# Patient Record
Sex: FEMALE | Race: WHITE | NOT HISPANIC OR LATINO | Employment: FULL TIME | URBAN - METROPOLITAN AREA
[De-identification: names, ages, dates, MRNs, and addresses within clinical notes are randomized per-mention and may not be internally consistent; named-entity substitution may affect disease eponyms.]

---

## 2023-12-18 ENCOUNTER — HOSPITAL ENCOUNTER (EMERGENCY)
Facility: HOSPITAL | Age: 33
Discharge: HOME | End: 2023-12-18
Attending: EMERGENCY MEDICINE | Admitting: EMERGENCY MEDICINE
Payer: COMMERCIAL

## 2023-12-18 ENCOUNTER — APPOINTMENT (EMERGENCY)
Dept: RADIOLOGY | Facility: HOSPITAL | Age: 33
End: 2023-12-18
Payer: COMMERCIAL

## 2023-12-18 ENCOUNTER — TELEPHONE (OUTPATIENT)
Dept: OBGYN CLINIC | Facility: CLINIC | Age: 33
End: 2023-12-18

## 2023-12-18 VITALS
TEMPERATURE: 99 F | BODY MASS INDEX: 24.6 KG/M2 | SYSTOLIC BLOOD PRESSURE: 107 MMHG | OXYGEN SATURATION: 97 % | HEART RATE: 102 BPM | WEIGHT: 143.3 LBS | RESPIRATION RATE: 18 BRPM | DIASTOLIC BLOOD PRESSURE: 53 MMHG

## 2023-12-18 DIAGNOSIS — J10.1 INFLUENZA A: Primary | ICD-10-CM

## 2023-12-18 DIAGNOSIS — R07.89 CHEST TIGHTNESS: ICD-10-CM

## 2023-12-18 LAB
ALBUMIN SERPL BCP-MCNC: 3.6 G/DL (ref 3.5–5)
ALP SERPL-CCNC: 80 U/L (ref 34–104)
ALT SERPL W P-5'-P-CCNC: 8 U/L (ref 7–52)
ANION GAP SERPL CALCULATED.3IONS-SCNC: 6 MMOL/L
AST SERPL W P-5'-P-CCNC: 12 U/L (ref 13–39)
BASOPHILS # BLD AUTO: 0.03 THOUSANDS/ÂΜL (ref 0–0.1)
BASOPHILS NFR BLD AUTO: 0 % (ref 0–1)
BILIRUB SERPL-MCNC: 0.34 MG/DL (ref 0.2–1)
BUN SERPL-MCNC: 7 MG/DL (ref 5–25)
CALCIUM SERPL-MCNC: 8.4 MG/DL (ref 8.4–10.2)
CARDIAC TROPONIN I PNL SERPL HS: 5 NG/L
CHLORIDE SERPL-SCNC: 105 MMOL/L (ref 96–108)
CO2 SERPL-SCNC: 24 MMOL/L (ref 21–32)
CREAT SERPL-MCNC: 0.61 MG/DL (ref 0.6–1.3)
EOSINOPHIL # BLD AUTO: 0.11 THOUSAND/ÂΜL (ref 0–0.61)
EOSINOPHIL NFR BLD AUTO: 1 % (ref 0–6)
ERYTHROCYTE [DISTWIDTH] IN BLOOD BY AUTOMATED COUNT: 11.9 % (ref 11.6–15.1)
FLUAV RNA RESP QL NAA+PROBE: POSITIVE
FLUBV RNA RESP QL NAA+PROBE: NEGATIVE
GFR SERPL CREATININE-BSD FRML MDRD: 120 ML/MIN/1.73SQ M
GLUCOSE SERPL-MCNC: 77 MG/DL (ref 65–140)
HCT VFR BLD AUTO: 35.5 % (ref 34.8–46.1)
HGB BLD-MCNC: 11.9 G/DL (ref 11.5–15.4)
IMM GRANULOCYTES # BLD AUTO: 0.1 THOUSAND/UL (ref 0–0.2)
IMM GRANULOCYTES NFR BLD AUTO: 1 % (ref 0–2)
LYMPHOCYTES # BLD AUTO: 1.1 THOUSANDS/ÂΜL (ref 0.6–4.47)
LYMPHOCYTES NFR BLD AUTO: 13 % (ref 14–44)
MCH RBC QN AUTO: 31.7 PG (ref 26.8–34.3)
MCHC RBC AUTO-ENTMCNC: 33.5 G/DL (ref 31.4–37.4)
MCV RBC AUTO: 95 FL (ref 82–98)
MONOCYTES # BLD AUTO: 0.52 THOUSAND/ÂΜL (ref 0.17–1.22)
MONOCYTES NFR BLD AUTO: 6 % (ref 4–12)
NEUTROPHILS # BLD AUTO: 6.47 THOUSANDS/ÂΜL (ref 1.85–7.62)
NEUTS SEG NFR BLD AUTO: 79 % (ref 43–75)
NRBC BLD AUTO-RTO: 0 /100 WBCS
PLATELET # BLD AUTO: 188 THOUSANDS/UL (ref 149–390)
PMV BLD AUTO: 10.1 FL (ref 8.9–12.7)
POTASSIUM SERPL-SCNC: 3.6 MMOL/L (ref 3.5–5.3)
PROT SERPL-MCNC: 6.4 G/DL (ref 6.4–8.4)
RBC # BLD AUTO: 3.75 MILLION/UL (ref 3.81–5.12)
RSV RNA RESP QL NAA+PROBE: NEGATIVE
SARS-COV-2 RNA RESP QL NAA+PROBE: NEGATIVE
SODIUM SERPL-SCNC: 135 MMOL/L (ref 135–147)
WBC # BLD AUTO: 8.33 THOUSAND/UL (ref 4.31–10.16)

## 2023-12-18 PROCEDURE — 99285 EMERGENCY DEPT VISIT HI MDM: CPT | Performed by: EMERGENCY MEDICINE

## 2023-12-18 PROCEDURE — 71046 X-RAY EXAM CHEST 2 VIEWS: CPT

## 2023-12-18 PROCEDURE — 93005 ELECTROCARDIOGRAM TRACING: CPT

## 2023-12-18 PROCEDURE — 99284 EMERGENCY DEPT VISIT MOD MDM: CPT

## 2023-12-18 PROCEDURE — 84484 ASSAY OF TROPONIN QUANT: CPT | Performed by: EMERGENCY MEDICINE

## 2023-12-18 PROCEDURE — 85025 COMPLETE CBC W/AUTO DIFF WBC: CPT | Performed by: EMERGENCY MEDICINE

## 2023-12-18 PROCEDURE — 96360 HYDRATION IV INFUSION INIT: CPT

## 2023-12-18 PROCEDURE — 0241U HB NFCT DS VIR RESP RNA 4 TRGT: CPT | Performed by: EMERGENCY MEDICINE

## 2023-12-18 PROCEDURE — 36415 COLL VENOUS BLD VENIPUNCTURE: CPT | Performed by: EMERGENCY MEDICINE

## 2023-12-18 PROCEDURE — 80053 COMPREHEN METABOLIC PANEL: CPT | Performed by: EMERGENCY MEDICINE

## 2023-12-18 RX ORDER — OSELTAMIVIR PHOSPHATE 75 MG/1
75 CAPSULE ORAL EVERY 12 HOURS
Qty: 10 CAPSULE | Refills: 0 | Status: SHIPPED | OUTPATIENT
Start: 2023-12-18 | End: 2023-12-23

## 2023-12-18 RX ORDER — OSELTAMIVIR PHOSPHATE 75 MG/1
75 CAPSULE ORAL ONCE
Status: COMPLETED | OUTPATIENT
Start: 2023-12-18 | End: 2023-12-18

## 2023-12-18 RX ORDER — OSELTAMIVIR PHOSPHATE 75 MG/1
75 CAPSULE ORAL EVERY 12 HOURS
Qty: 10 CAPSULE | Refills: 0 | Status: SHIPPED | OUTPATIENT
Start: 2023-12-18 | End: 2023-12-18

## 2023-12-18 RX ADMIN — OSELTAMIVIR PHOSPHATE 75 MG: 75 CAPSULE ORAL at 16:03

## 2023-12-18 RX ADMIN — SODIUM CHLORIDE 1000 ML: 0.9 INJECTION, SOLUTION INTRAVENOUS at 16:08

## 2023-12-18 NOTE — QUICK NOTE
Fetal data:  Nonstress test: date 12/18/23 Time: 1806 - 1826  Baseline:  130bpm  Variability: moderate  Accelerations: present, 10x10  Decelerations: absent  Contractions: absent  Assessment: reactive  Plan: Stable for discharge. Planned outpatient follow-up to establish care with ALESSANDRA on 1/3/24.    D/w Dr. Vivian Currie MD  OB/GYN PGY-3

## 2023-12-18 NOTE — ED PROVIDER NOTES
History  Chief Complaint   Patient presents with    Cough     Patient here for eval of productive cough, cold like sx x2 weeks. Chest pressure, upper back pain and SOB x3 days that's worsening. 27 weeks PREGNANT. Denies any bleeding or change in fetal movements. Taking robitussin OTC mild relief.      33-year-old female, 28 weeks pregnant, G2, P1, coming into the ED for evaluation of cough, fatigue, hoarse voice for the past 3 days starting Friday.  She states she has essentially been sick for the past 3 weeks but things changed on Friday.  She states she has had discomfort in her chest, not just with coughing but at rest.  She denies a productive cough.  Denies a fever.  She went to OB triage and they sent her down to the ER for evaluation.  She denies any leakage of vaginal fluid, no vaginal bleeding, normal fetal movement.      History provided by:  Patient   used: No    Cough  Associated symptoms: shortness of breath        Prior to Admission Medications   Prescriptions Last Dose Informant Patient Reported? Taking?   Lactobacillus (PROBIOTIC ACIDOPHILUS PO)   Yes No   Sig: Take by mouth   ciprofloxacin (CILOXAN) 0.3 % ophthalmic solution   No No   Sig: Apply 1 drop to eye 4 (four) times a day   hyoscyamine (ANASPAZ,LEVSIN) 0.125 MG tablet  Self Yes No   Sig: Take 0.125 mg by mouth   magnesium 30 MG tablet  Self Yes No   Sig: Take 30 mg by mouth 2 (two) times a day   multivitamin (THERAGRAN) TABS  Self Yes No   Sig: Take 1 tablet by mouth daily   norethindrone-ethinyl estradiol (JUNEL FE 1/20) 1-20 MG-MCG per tablet  Self Yes No   Sig: Take 1 tablet by mouth daily   omeprazole (PriLOSEC) 20 mg delayed release capsule  Self Yes No   Sig: Take 20 mg by mouth daily   pyridoxine (VITAMIN B6) 100 mg tablet  Self Yes No   Sig: Take 100 mg by mouth daily      Facility-Administered Medications: None       Past Medical History:   Diagnosis Date    Abnormal Pap smear of cervix 01/2014    Herpes      Perineal pain in female 06/2018       Past Surgical History:   Procedure Laterality Date    TOOTH EXTRACTION         Family History   Problem Relation Age of Onset    Other Family         Cardiac Disorder    Stroke Family     Hypertension Family     Hypertension Father     Hypertension Maternal Grandmother      I have reviewed and agree with the history as documented.    E-Cigarette/Vaping     E-Cigarette/Vaping Substances     Social History     Tobacco Use    Smoking status: Never    Smokeless tobacco: Never   Substance Use Topics    Alcohol use: Yes    Drug use: Never       Review of Systems   Constitutional:  Positive for fatigue.   Respiratory:  Positive for cough and shortness of breath.    All other systems reviewed and are negative.      Physical Exam  Physical Exam  Vitals and nursing note reviewed.   Constitutional:       General: She is not in acute distress.     Appearance: Normal appearance. She is well-developed and normal weight. She is not ill-appearing, toxic-appearing or diaphoretic.      Comments: Voice mildly hoarse   HENT:      Head: Normocephalic and atraumatic.      Right Ear: External ear normal.      Left Ear: External ear normal.      Nose: Nose normal.      Mouth/Throat:      Mouth: Mucous membranes are moist.      Pharynx: Oropharynx is clear.   Eyes:      Extraocular Movements: Extraocular movements intact.      Conjunctiva/sclera: Conjunctivae normal.      Pupils: Pupils are equal, round, and reactive to light.   Cardiovascular:      Rate and Rhythm: Normal rate and regular rhythm.      Pulses: Normal pulses.      Heart sounds: Normal heart sounds.   Pulmonary:      Effort: Pulmonary effort is normal. No respiratory distress.      Breath sounds: Normal breath sounds. No wheezing, rhonchi or rales.   Abdominal:      General: Abdomen is flat. Bowel sounds are normal. There is no distension or abdominal bruit. There are no signs of injury.      Palpations: Abdomen is soft. There is no  shifting dullness.      Tenderness: There is no abdominal tenderness.      Comments: Gravid abdomen, above umbilicus   Genitourinary:     Adnexa: Right adnexa normal and left adnexa normal.   Musculoskeletal:         General: No swelling or tenderness. Normal range of motion.      Cervical back: Normal range of motion and neck supple.      Right lower leg: No edema.      Left lower leg: No edema.   Skin:     General: Skin is warm and dry.      Capillary Refill: Capillary refill takes less than 2 seconds.   Neurological:      General: No focal deficit present.      Mental Status: She is alert and oriented to person, place, and time. Mental status is at baseline.   Psychiatric:         Mood and Affect: Mood normal.         Behavior: Behavior normal.         Vital Signs  ED Triage Vitals   Temperature Pulse Respirations Blood Pressure SpO2   12/18/23 1316 12/18/23 1316 12/18/23 1316 12/18/23 1316 12/18/23 1316   99 °F (37.2 °C) 102 18 107/53 97 %      Temp Source Heart Rate Source Patient Position - Orthostatic VS BP Location FiO2 (%)   12/18/23 1316 12/18/23 1316 -- 12/18/23 1316 --   Oral Monitor  Right arm       Pain Score       12/18/23 1321       7           Vitals:    12/18/23 1316   BP: 107/53   Pulse: 102         Visual Acuity      ED Medications  Medications   oseltamivir (TAMIFLU) capsule 75 mg (75 mg Oral Given 12/18/23 1603)   sodium chloride 0.9 % bolus 1,000 mL (0 mL Intravenous Stopped 12/18/23 1708)       Diagnostic Studies  Results Reviewed       Procedure Component Value Units Date/Time    HS Troponin 0hr (reflex protocol) [[id number]]  (Normal) Collected: 12/18/23 1608    Lab Status: Final result Specimen: Blood from Arm, Right Updated: 12/18/23 1655     hs TnI 0hr 5 ng/L     Comprehensive metabolic panel [[id number]]  (Abnormal) Collected: 12/18/23 1608    Lab Status: Final result Specimen: Blood from Arm, Right Updated: 12/18/23 1652     Sodium 135 mmol/L      Potassium 3.6 mmol/L      Chloride 105  mmol/L      CO2 24 mmol/L      ANION GAP 6 mmol/L      BUN 7 mg/dL      Creatinine 0.61 mg/dL      Glucose 77 mg/dL      Calcium 8.4 mg/dL      AST 12 U/L      ALT 8 U/L      Alkaline Phosphatase 80 U/L      Total Protein 6.4 g/dL      Albumin 3.6 g/dL      Total Bilirubin 0.34 mg/dL      eGFR 120 ml/min/1.73sq m     Narrative:      National Kidney Disease Foundation guidelines for Chronic Kidney Disease (CKD):     Stage 1 with normal or high GFR (GFR > 90 mL/min/1.73 square meters)    Stage 2 Mild CKD (GFR = 60-89 mL/min/1.73 square meters)    Stage 3A Moderate CKD (GFR = 45-59 mL/min/1.73 square meters)    Stage 3B Moderate CKD (GFR = 30-44 mL/min/1.73 square meters)    Stage 4 Severe CKD (GFR = 15-29 mL/min/1.73 square meters)    Stage 5 End Stage CKD (GFR <15 mL/min/1.73 square meters)  Note: GFR calculation is accurate only with a steady state creatinine    CBC and differential [[id number]]  (Abnormal) Collected: 12/18/23 1608    Lab Status: Final result Specimen: Blood from Arm, Right Updated: 12/18/23 1633     WBC 8.33 Thousand/uL      RBC 3.75 Million/uL      Hemoglobin 11.9 g/dL      Hematocrit 35.5 %      MCV 95 fL      MCH 31.7 pg      MCHC 33.5 g/dL      RDW 11.9 %      MPV 10.1 fL      Platelets 188 Thousands/uL      nRBC 0 /100 WBCs      Neutrophils Relative 79 %      Immat GRANS % 1 %      Lymphocytes Relative 13 %      Monocytes Relative 6 %      Eosinophils Relative 1 %      Basophils Relative 0 %      Neutrophils Absolute 6.47 Thousands/µL      Immature Grans Absolute 0.10 Thousand/uL      Lymphocytes Absolute 1.10 Thousands/µL      Monocytes Absolute 0.52 Thousand/µL      Eosinophils Absolute 0.11 Thousand/µL      Basophils Absolute 0.03 Thousands/µL     FLU/RSV/COVID - if FLU/RSV clinically relevant [[id number]]  (Abnormal) Collected: 12/18/23 1319    Lab Status: Final result Specimen: Nares from Nose Updated: 12/18/23 1418     SARS-CoV-2 Negative     INFLUENZA A PCR Positive     INFLUENZA B PCR  Negative     RSV PCR Negative    Narrative:      FOR PEDIATRIC PATIENTS - copy/paste COVID Guidelines URL to browser: [email or web address]    SARS-CoV-2 assay is a Nucleic Acid Amplification assay intended for the  qualitative detection of nucleic acid from SARS-CoV-2 in nasopharyngeal  swabs. Results are for the presumptive identification of SARS-CoV-2 RNA.    Positive results are indicative of infection with SARS-CoV-2, the virus  causing COVID-19, but do not rule out bacterial infection or co-infection  with other viruses. Laboratories within the [place] and its  territories are required to report all positive results to the appropriate  public health authorities. Negative results do not preclude SARS-CoV-2  infection and should not be used as the sole basis for treatment or other  patient management decisions. Negative results must be combined with  clinical observations, patient history, and epidemiological information.  This test has not been FDA cleared or approved.    This test has been authorized by FDA under an Emergency Use Authorization  (EUA). This test is only authorized for the duration of time the  declaration that circumstances exist justifying the authorization of the  emergency use of an in vitro diagnostic tests for detection of SARS-CoV-2  virus and/or diagnosis of COVID-19 infection under section 564(b)(1) of  the Act, 21 U.S.C. 360bbb-3(b)(1), unless the authorization is terminated  or revoked sooner. The test has been validated but independent review by FDA  and CLIA is pending.    Test performed using [employer] GeneXpert: This RT-PCR assay targets N2,  a region unique to SARS-CoV-2. A conserved region in the E-gene was chosen  for pan-Sarbecovirus detection which includes SARS-CoV-2.    According to CMS-2020-01-R, this platform meets the definition of high-throughput technology.                   XR chest 2 views   Final Result by  Sherry Morrison MD (12/18 1538)      No acute cardiopulmonary disease.               Workstation performed: [id number]                    Procedures  Procedures         ED Course  ED Course as of 12/22/23 1101   Mon Dec 18, 2023   1751 Discussed with OB resident Dr Currie - will send nurse down for NST in the ED given she's Flu A positive.   1751 hs TnI 0hr: 5   1752 WBC: 8.33   1752 INFLU A PCR(!): Positive   1752 EKG and troponin normal. Chest discomfort likely not myocarditis/pericarditis given these findings. Most likely from frequent coughing, or pleurisy from viral infection                                             Medical Decision Making  32 yo F, G2, P1, with flu like symptoms and chest discomfort. Not only with coughing.  Ddx Influenza, covid19, pneumonia, pleurisy, myocarditis/pericarditis.  ED eval: normal physical exam except for mildly hoarse voice. CXR normal - no pneumonia. Labs: Flu A positive. Troponin normal.   Start on tamiflu, Flu antiviral to reduce risk of Flu complications and hospitalization given her pregnancy. Case d/w OB Gyn, performed NST which was reassuring - Dr Currie, OB Gyn resident assessed and pt stable for discharge.     Amount and/or Complexity of Data Reviewed  Labs: ordered. Decision-making details documented in ED Course.  Radiology: ordered.    Risk  Prescription drug management.             Disposition  Final diagnoses:   Influenza A   Chest tightness     Time reflects when diagnosis was documented in both MDM as applicable and the Disposition within this note       Time User Action Codes Description Comment    12/18/2023  6:42 PM Joel Loya Add [J10.1] Influenza A     12/18/2023  6:42 PM Joel Loya Add [R07.89] Chest tightness           ED Disposition       ED Disposition   Discharge    Condition   Stable    Date/Time   Mon Dec 18, 2023  6:42 PM    Comment   Jeannie Carr discharge to home/self care.                   Follow-up Information    None          Discharge Medication List as of 12/18/2023  6:43 PM        START taking these medications    Details   oseltamivir (TAMIFLU) 75 mg capsule Take 1 capsule (75 mg total) by mouth every 12 (twelve) hours for 5 days, Starting Mon 12/18/2023, Until Sat 12/23/2023, Normal           CONTINUE these medications which have NOT CHANGED    Details   ciprofloxacin (CILOXAN) 0.3 % ophthalmic solution Apply 1 drop to eye 4 (four) times a day, Starting Tue 4/16/2019, Normal      hyoscyamine (ANASPAZ,LEVSIN) 0.125 MG tablet Take 0.125 mg by mouth, Starting Thu 10/26/2017, Historical Med      Lactobacillus (PROBIOTIC ACIDOPHILUS PO) Take by mouth, Historical Med      magnesium 30 MG tablet Take 30 mg by mouth 2 (two) times a day, Historical Med      multivitamin (THERAGRAN) TABS Take 1 tablet by mouth daily, Historical Med      norethindrone-ethinyl estradiol (JUNEL FE 1/20) 1-20 MG-MCG per tablet Take 1 tablet by mouth daily, Historical Med      omeprazole (PriLOSEC) 20 mg delayed release capsule Take 20 mg by mouth daily, Historical Med      pyridoxine (VITAMIN B6) 100 mg tablet Take 100 mg by mouth daily, Historical Med             No discharge procedures on file.    PDMP Review       None            ED Provider  Electronically Signed by             Joel Loya DO  12/22/23 2814

## 2023-12-19 LAB
ATRIAL RATE: 95 BPM
P AXIS: 73 DEGREES
PR INTERVAL: 140 MS
QRS AXIS: 66 DEGREES
QRSD INTERVAL: 70 MS
QT INTERVAL: 376 MS
QTC INTERVAL: 472 MS
T WAVE AXIS: 41 DEGREES
VENTRICULAR RATE: 95 BPM

## 2023-12-24 ENCOUNTER — OFFICE VISIT (OUTPATIENT)
Dept: URGENT CARE | Age: 33
End: 2023-12-24

## 2023-12-24 VITALS
OXYGEN SATURATION: 98 % | HEART RATE: 84 BPM | TEMPERATURE: 98 F | DIASTOLIC BLOOD PRESSURE: 78 MMHG | RESPIRATION RATE: 18 BRPM | SYSTOLIC BLOOD PRESSURE: 118 MMHG

## 2023-12-24 DIAGNOSIS — J01.40 ACUTE NON-RECURRENT PANSINUSITIS: Primary | ICD-10-CM

## 2023-12-24 PROCEDURE — G0382 LEV 3 HOSP TYPE B ED VISIT: HCPCS

## 2023-12-24 RX ORDER — AMOXICILLIN 875 MG/1
875 TABLET, COATED ORAL 2 TIMES DAILY
Qty: 10 TABLET | Refills: 0 | Status: SHIPPED | OUTPATIENT
Start: 2023-12-24 | End: 2023-12-29

## 2023-12-24 NOTE — PROGRESS NOTES
[facility]        NAME: Jeannie Carr is a 33 y.o. female  : 1990    MRN: [id number]  DATE: 2023  TIME: 10:03 AM    Assessment and Plan   Acute non-recurrent pansinusitis [J01.40]  1. Acute non-recurrent pansinusitis  amoxicillin (AMOXIL) 875 mg tablet        Given that patient's symptoms are progressing over the past 2 weeks, will treat for sinusitis with amoxicillin.  Discussed with patient that this may not cause improvement as symptoms are most likely viral in nature.  Patient Instructions     Take amoxicillin 2 times daily for the next 5 days.  Ensure adequate fluid intake.  Continue with pregnancy with a safe cough and cold medication.  Follow-up with PCP or OB/GYN if symptoms persist.  Proceed to  ER if symptoms worsen.    Chief Complaint     Chief Complaint   Patient presents with    Headache    Sinusitis    loss of taste    loss of smell     Patient states that she has been sick for the last 2 weeks with sinus issues, intense headache( the worse headache she ever had), loss of smell and taste.  Patient was seen in ER on  tested positive for the flu- placed in meds but not feeling better since her visit         History of Present Illness       33-year-old female presenting for evaluation of sinus pressure.  Patient states that she has been ill over the past 2 weeks.  States that her symptoms have been progressing over the past week and was seen at the ER earlier this week.  She states that she was tested for COVID, flu and RSV and treated with Tamiflu for a positive flu PCR.  Since completing the treatment she has had minimal relief of her symptoms.  She states that her headache is so severe that she is experiencing photophobia and phonophobia.  She has been using Tylenol, saline spray, Lisa pot and humidifier with minimal relief.  Of note, patient is 27 weeks pregnant.      Headache  Sinusitis  Associated symptoms include congestion, headaches and sinus pressure.  Pertinent negatives include no chills or shortness of breath.       Review of Systems   Review of Systems   Constitutional:  Negative for chills and fever.   HENT:  Positive for congestion, sinus pressure and sinus pain.    Eyes:  Positive for photophobia.   Respiratory:  Negative for shortness of breath.    Cardiovascular:  Negative for chest pain.   Gastrointestinal:  Negative for diarrhea, nausea and vomiting.   Neurological:  Positive for headaches.   All other systems reviewed and are negative.        Current Medications       Current Outpatient Medications:     amoxicillin (AMOXIL) 875 mg tablet, Take 1 tablet (875 mg total) by mouth 2 (two) times a day for 5 days, Disp: 10 tablet, Rfl: 0    ciprofloxacin (CILOXAN) 0.3 % ophthalmic solution, Apply 1 drop to eye 4 (four) times a day, Disp: 5 mL, Rfl: 0    hyoscyamine (ANASPAZ,LEVSIN) 0.125 MG tablet, Take 0.125 mg by mouth, Disp: , Rfl:     Lactobacillus (PROBIOTIC ACIDOPHILUS PO), Take by mouth, Disp: , Rfl:     magnesium 30 MG tablet, Take 30 mg by mouth 2 (two) times a day, Disp: , Rfl:     multivitamin (THERAGRAN) TABS, Take 1 tablet by mouth daily, Disp: , Rfl:     norethindrone-ethinyl estradiol (JUNEL FE 1/20) 1-20 MG-MCG per tablet, Take 1 tablet by mouth daily, Disp: , Rfl:     omeprazole (PriLOSEC) 20 mg delayed release capsule, Take 20 mg by mouth daily, Disp: , Rfl:     pyridoxine (VITAMIN B6) 100 mg tablet, Take 100 mg by mouth daily, Disp: , Rfl:     Current Allergies     Allergies as of 12/24/2023    (No Known Allergies)            The following portions of the patient's history were reviewed and updated as appropriate: allergies, current medications, past family history, past medical history, past social history, past surgical history and problem list.     Past Medical History:   Diagnosis Date    Abnormal Pap smear of cervix 01/2014    Herpes     Perineal pain in female 06/2018       Past Surgical History:   Procedure Laterality Date    TOOTH  EXTRACTION         Family History   Problem Relation Age of Onset    Other Family         Cardiac Disorder    Stroke Family     Hypertension Family     Hypertension Father     Hypertension Maternal Grandmother          Medications have been verified.        Objective   /78 (BP Location: Right arm, Patient Position: Sitting, Cuff Size: Standard)   Pulse 84   Temp 98 °F (36.7 °C)   Resp 18   SpO2 98%        Physical Exam     Physical Exam  Vitals and nursing note reviewed.   Constitutional:       General: She is not in acute distress.     Appearance: Normal appearance. She is normal weight. She is not ill-appearing, toxic-appearing or diaphoretic.   HENT:      Head: Normocephalic and atraumatic.      Right Ear: Tympanic membrane normal.      Nose: Congestion present.      Right Sinus: Maxillary sinus tenderness and frontal sinus tenderness present.      Left Sinus: Maxillary sinus tenderness and frontal sinus tenderness present.      Mouth/Throat:      Mouth: Mucous membranes are moist.      Pharynx: Oropharynx is clear. No oropharyngeal exudate or posterior oropharyngeal erythema.   Eyes:      General:         Right eye: No discharge.         Left eye: No discharge.   Cardiovascular:      Rate and Rhythm: Normal rate.      Pulses: Normal pulses.   Pulmonary:      Effort: Pulmonary effort is normal.   Skin:     General: Skin is warm and dry.   Neurological:      Mental Status: She is alert.   Psychiatric:         Mood and Affect: Mood normal.         Behavior: Behavior normal.

## 2023-12-24 NOTE — PATIENT INSTRUCTIONS
Take amoxicillin 2 times daily for the next 5 days.  Ensure adequate fluid intake.  Continue with pregnancy with a safe cough and cold medication.  Follow-up with PCP or OB/GYN if symptoms persist.

## 2024-01-03 ENCOUNTER — OFFICE VISIT (OUTPATIENT)
Dept: OBGYN CLINIC | Facility: CLINIC | Age: 34
End: 2024-01-03

## 2024-01-03 VITALS — SYSTOLIC BLOOD PRESSURE: 120 MMHG | BODY MASS INDEX: 25.1 KG/M2 | WEIGHT: 146.2 LBS | DIASTOLIC BLOOD PRESSURE: 60 MMHG

## 2024-01-03 DIAGNOSIS — Z34.83 ENCOUNTER FOR SUPERVISION OF OTHER NORMAL PREGNANCY IN THIRD TRIMESTER: Primary | ICD-10-CM

## 2024-01-03 PROCEDURE — PNV: Performed by: OBSTETRICS & GYNECOLOGY

## 2024-01-03 RX ORDER — FLUOXETINE HYDROCHLORIDE 20 MG/1
CAPSULE ORAL
COMMUNITY
Start: 2024-01-01

## 2024-01-03 RX ORDER — FLUOXETINE 10 MG/1
CAPSULE ORAL
COMMUNITY
Start: 2024-01-01

## 2024-01-03 RX ORDER — MOMETASONE FUROATE 1 MG/G
CREAM TOPICAL
COMMUNITY
Start: 2023-11-27

## 2024-01-04 ENCOUNTER — INITIAL PRENATAL (OUTPATIENT)
Dept: OBGYN CLINIC | Facility: CLINIC | Age: 34
End: 2024-01-04

## 2024-01-04 DIAGNOSIS — Z34.83 PRENATAL CARE, SUBSEQUENT PREGNANCY, THIRD TRIMESTER: Primary | ICD-10-CM

## 2024-01-04 PROCEDURE — OBC

## 2024-01-04 NOTE — PATIENT INSTRUCTIONS
Pregnancy at 11 to 14 Weeks   AMBULATORY CARE:   Changes happening to your body:  You are now at the end of your first trimester and entering your second trimester. Morning sickness usually goes away by this time. You may have other symptoms such as fatigue, frequent urination, and headaches. You may have gained 2 to 4 pounds by now.  Seek care immediately if:   You have pain or cramping in your abdomen or low back.    You have heavy vaginal bleeding or clotting.    You pass material that looks like tissue or large clots. Collect the material and bring it with you.    Call your doctor or obstetrician if:   You cannot keep food or drinks down, and you are losing weight.    You have light vaginal bleeding.    You have chills or a fever.    You have vaginal itching, burning, or pain.    You have yellow, green, white, or foul-smelling vaginal discharge.    You have pain or burning when you urinate, less urine than usual, or pink or bloody urine.    You have questions or concerns about your condition or care.    How to care for yourself at this stage of your pregnancy:       Get plenty of rest.  You may feel more tired than normal. You may need to take naps or go to bed earlier.    Manage nausea and vomiting.  Avoid fatty and spicy foods. Eat small meals throughout the day instead of large meals. Mitali may help to decrease nausea. Ask your healthcare provider about other ways of decreasing nausea and vomiting.    Eat a variety of healthy foods.  Healthy foods include fruits, vegetables, whole-grain breads, low-fat dairy foods, beans, lean meats, and fish. Drink liquids as directed. Ask how much liquid to drink each day and which liquids are best for you. Limit caffeine to less than 200 milligrams each day. Limit your intake of fish to 2 servings each week. Choose fish low in mercury such as canned light tuna, shrimp, salmon, cod, or tilapia. Do not  eat fish high in mercury such as swordfish, tilefish, vineet mackerel,  and shark.         Take prenatal vitamins as directed.  Your need for certain vitamins and minerals, such as folic acid, increases during pregnancy. Prenatal vitamins provide some of the extra vitamins and minerals you need. Prenatal vitamins may also help to decrease the risk of certain birth defects.         Do not smoke.  Smoking increases your risk of a miscarriage and other health problems during your pregnancy. Smoking can cause your baby to be born too early or weigh less at birth. Ask your healthcare provider for information if you need help quitting.    Do not drink alcohol.  Alcohol passes from your body to your baby through the placenta. It can affect your baby's brain development and cause fetal alcohol syndrome (FAS). FAS is a group of conditions that causes mental, behavior, and growth problems.    Talk to your healthcare provider before you take any medicines.  Many medicines may harm your baby if you take them when you are pregnant. Do not take any medicines, vitamins, herbs, or supplements without first talking to your healthcare provider. Never use illegal or street drugs (such as marijuana or cocaine) while you are pregnant.  Safety tips during pregnancy:   Avoid hot tubs and saunas.  Do not use a hot tub or sauna while you are pregnant, especially during your first trimester. Hot tubs and saunas may raise your baby's temperature and increase the risk of birth defects.    Avoid toxoplasmosis.  This is an infection caused by eating raw meat or being around infected cat feces. It can cause birth defects, miscarriages, and other problems. Wash your hands after you touch raw meat. Make sure any meat is well-cooked before you eat it. Avoid raw eggs and unpasteurized milk. Use gloves or ask someone else to clean your cat's litter box while you are pregnant.    Changes happening with your baby:  Your baby has fully formed fingernails and toenails. Your baby's heartbeat can now be heard. Ask your  healthcare provider if you can listen to your baby's heartbeat. By week 14, your baby is over 4 inches long from the top of the head to the rump (baby's bottom). Your baby weighs over 3 ounces.  Prenatal care:  Prenatal care is a series of visits with your healthcare provider throughout your pregnancy. During the first 28 weeks of your pregnancy, you will see your healthcare provider 1 time each month. Prenatal care can help prevent problems during pregnancy and childbirth. Your healthcare provider will check your blood pressure and weight. Your baby's heart rate will also be checked. You may also need the following at some visits:  A pelvic exam  allows your healthcare provider to see your cervix (the bottom part of your uterus). Your healthcare provider will use a speculum to open your vagina. He or she will check the size and shape of your uterus.    Blood tests  may be done to check for any of the following:    Gestational diabetes or anemia (low iron level)    Blood type or Rh factor, or certain birth defects    Immunity to certain diseases, such as chickenpox or rubella    An infection, such as a sexually transmitted infection, HIV, or hepatitis B    Hepatitis B  may need to be prevented or treated. Hepatitis B is inflammation of the liver caused by the hepatitis B virus (HBV). HBV can spread from a mother to her baby during delivery. You will be checked for HBV as early as possible in the first trimester of each pregnancy. You need the test even if you received the hepatitis B vaccine or were tested before. You may need to have an HBV infection treated before you give birth.    Urine tests  may also be done to check for sugar and protein. These can be signs of gestational diabetes or preeclampsia. Urine tests may also be done to check for signs of infection.    A fetal ultrasound  shows pictures of your baby inside your uterus. The pictures are used to check your baby's development, movement, and position.          Genetic disorder screening tests  may be offered to you. These tests check your baby's risk for genetic disorders such as Down syndrome. A screening test includes a blood test and ultrasound.    Follow up with your doctor or obstetrician as directed:  Go to all prenatal visits. Write down your questions so you remember to ask them during your visits.  © Copyright [employer] 2023 Information is for End User's use only and may not be sold, redistributed or otherwise used for commercial purposes.  The above information is an  only. It is not intended as medical advice for individual conditions or treatments. Talk to your doctor, nurse or pharmacist before following any medical regimen to see if it is safe and effective for you.

## 2024-01-04 NOTE — PROGRESS NOTES
Assessment/Plan:      Diagnoses and all orders for this visit:    Encounter for supervision of other normal pregnancy in third trimester    Other orders  -     FLUoxetine (PROzac) 10 mg capsule  -     FLUoxetine (PROzac) 20 mg capsule  -     mometasone (ELOCON) 0.1 % cream; APPLY TOPICALLY TO THE AFFECTED AREAS EVERY DAY      Return for PN1 after OB intake.  Office staff to call old practice for more records.     Subjective:     Patient ID: Jeannie Carr is a 33 y.o. female.    Patient presents today to [facility] care.  She is originally from the  but has been living in [place].  She had her first pregnancy there which was complicated by PPROM and  at 35 weeks.  She was undergoing cervical surveillance with this pregnancy.  Of note the records from her first pregnancy were sent but not records from this pregnancy.  She denies problems with this pregnancy but has not yet had 28 week labs.  Discussed waiting until other results available to ensure all routine PN labs were done in first trimester or she may have to go back to the lab.  She also believes she needs a growth scan - will refer to [facility] for that.          Review of Systems      Objective:     Physical Exam    FHR present on doppler

## 2024-01-04 NOTE — PROGRESS NOTES
OB INTAKE INTERVIEW  Patient is 33 y.o.y.o. who presents for OB intake at 30-1 wks  She is accompanied by phone during this encounter  The father of her baby (Dariel) is involved in the pregnancy and they are .       hx of vag   PPROM  Last Menstrual Period: 23  Ultrasound: Measured 9 weeks 0 days on 8/10/23  Estimated Date of Delivery: 3/13/24 via LMP     Signs/Symptoms of Pregnancy  Current pregnancy symptoms: N & V 1 st trimester  Constipation YES  Headaches YES-tylenol  Cramping/spotting no  PICA cravings no    Diabetes-    If patient has 1 or more, please order early 1 hour GTT  History of GDM no  BMI >35 no  History of PCOS or current metformin use no  History of LGA/macrosomic infant (4000g/9lbs) no    If patient has 2 or more, please order early 1 hour GTT  BMI>30 no  AMA no  First degree relative with type 2 diabetes no  History of chronic HTN, hyperlipidemia, elevated A1C no  High risk race (, , ,  or ) no    Hypertension- if you answer yes, please order preeclampsia labs (cbc, comprehensive metabolic panel, urine protein creatinine ratio, uric acid)  History of of chronic HTN no  History of gestational HTN no  History of preeclampsia, eclampsia, or HELLP syndrome no  History of diabetes no  History of lupus, autoimmune disease, kidney disease no    Thyroid- if yes order TSH with reflex T4  History of thyroid disease no    Bleeding Disorder or Hx of DVT-patient or first degree relative with history of. Order the following if not done previously.   (Factor V, antithrombin III, prothrombin gene mutation, protein C and S Ag, lupus anticoagulant, anticardiolipin, beta-2 glycoprotein)   YES- pt's dad    OB/GYN-  History of abnormal pap smear YES       Date of last pap smear 8/10/2023 wnl  History of HPV no  History of Herpes/HSV YES- one outbreak several yrs ago  History of other STI (gonorrhea, chlamydia, trich) no  History of  prior  YES  History of prior  no  History of  delivery prior to 36 weeks 6 days YES  History of blood transfusion no  Ok for blood transfusion yes    Substance screening- if yes outside of tobacco for her or anyone in her home-order urine drug screen  History of tobacco use no  Currently using tobacco no  Currently using alcohol no  Presently using drugs no  Past drug use  YES- occas marijuana  IV drug use- no  Partner drug use no  Parent/Family drug use YES- pt's brother    MRSA Screening-   Does the pt have a hx of MRSA? no  If yes- please follow MRSA protocol and obtain a nasal swab for MRSA culture    Immunizations:  Influenza vaccine given this season no- was + for flu 2023  Discussed Tdap vaccine yes- per pt, had vaccine prior to transferring care.  Discussed COVID Vaccine yes- never had vaccines , + 2020    Genetic/MFM-  Do you or your partner have a history of any of the following in yourselves or first degree relatives?  Cystic fibrosis no  Spinal muscular atrophy no  Hemoglobinopathy/Sickle Cell/Thalassemia no  Fragile X Intellectual Disability no    If yes, discuss Carrier Screening and recommend consultation with MFM/genetic counseling and place specific [facility] Referral for.  If no, discuss Carrier Screening being completed once in a lifetime as a standard of care lab test along with Nuchal Ultrasound. Place orders for [id number] and [id number] along with [facility] Referral.  Patient interested yes- prev. Had testing done in [place].    Appointment at [facility] made no- referral entered today    Interview education  [facility] Pregnancy Essentials Book reviewed, discussed and attached to their AVS yes    Nurse/Family Partnership- patient may qualify no; referral placed no    Prenatal lab work scripts yes  Extra labs ordered:  28 wks labs, DVT labs    Aspirin/Preeclampsia Screen    Risk Level Risk Factor Recommendation   LOW Prior Uncomplicated full-term delivery no No Aspirin recommendation        MODERATE  Nulliparity no Recommend low-dose aspirin if     BMI>30 no 2 or more moderate risk factors    Family History Preeclampsia (mother/sister) no     35yr old or greater no      or Low Socioeconomic no     IVF Pregnancy  no     Personal History Risks (low birth weight, prior adverse preg outcome, >10yr preg interval) no         HIGH History of Preeclampsia no Recommend low-dose aspirin if     Multifetal gestation no 1 or more high risk factors    Chronic HTN no     Type 1 or 2 Diabetes no     Renal Disease no     Autoimmune Disease  no      Contraindications to ASA therapy:  NSAID/ ASA allergy: no  Nasal polyps: no  Asthma with history of ASA induced bronchospasm: no  Relative contraindications:  History of GI bleed: no  Active peptic ulcer disease: no  Severe hepatic dysfunction: no    Patient should be recommended to take ASA 162mg during this pregnancy from 12-36wks to lower her risk of preeclampsia: no      The patient has a history now or in prior pregnancy notable for: Transfer from [place] at 30-1 wks   records scanned to chart,  AB+,  Hx of GERD,  IBS,  Hx of depression- prozac daily        Details that I feel the provider should be aware of: Pt's Dad - Hx of Blood clots, Aneurysm. - (labs ordered, along with 28 wks labs. )      PN1 visit scheduled. The patient was oriented to our practice, reviewed delivering physicians and [facility] for Delivery. All questions were answered.  PN transfer phone interview completed.  Pt &  are originally from [facility]- transferring OB care from [facility].- records scanned to chart.  Pt has f/u OB appt scheduled for 1/17/24.  Referral entered for [facility].- informed pt to expect a phone call in order to appt scheduling.  Pt verbalized understanding- if she does not hear from them by tomorrow, will call [facility] office. 28 wks Labs & DVT labs ordered in epic.  Pt to have bldwk drawn asap. Advised pt to call with any further questions/concerns.        Interviewed by: Cindy Covarrubias RN, CLC

## 2024-01-09 ENCOUNTER — TELEPHONE (OUTPATIENT)
Dept: OBGYN CLINIC | Facility: CLINIC | Age: 34
End: 2024-01-09

## 2024-01-09 NOTE — TELEPHONE ENCOUNTER
----- Message from Jeannie Carr sent at 1/9/2024 12:59 PM EST -----  Regarding: Blood Work/Genetic Testing   Contact: [phone]  Von Wadsworth! Hope you're well. I noticed part of the blood work that was ordered was genetic testing that needed to be scheduled. Was this ordered by accident? I don't believe we discussed getting that done. Thanks in advance.    pieter all pertinent systems normal

## 2024-01-09 NOTE — TELEPHONE ENCOUNTER
Called pt - informed of 28 wks labs ordered &  DVT labs ordered, due to pt having first degree relative with hx of blood clots & aneurysm .  Pt is questioning if a prior auth is needed for prothrombin gene mutation lab.  BRITT Ragland to precert & advise.  Informed will call pt back.

## 2024-01-10 ENCOUNTER — APPOINTMENT (OUTPATIENT)
Dept: LAB | Facility: CLINIC | Age: 34
End: 2024-01-10
Payer: COMMERCIAL

## 2024-01-10 DIAGNOSIS — Z34.83 PRENATAL CARE, SUBSEQUENT PREGNANCY, THIRD TRIMESTER: ICD-10-CM

## 2024-01-10 LAB
BASOPHILS # BLD AUTO: 0.03 THOUSANDS/ÂΜL (ref 0–0.1)
BASOPHILS NFR BLD AUTO: 0 % (ref 0–1)
EOSINOPHIL # BLD AUTO: 0.11 THOUSAND/ÂΜL (ref 0–0.61)
EOSINOPHIL NFR BLD AUTO: 1 % (ref 0–6)
ERYTHROCYTE [DISTWIDTH] IN BLOOD BY AUTOMATED COUNT: 12.4 % (ref 11.6–15.1)
GLUCOSE 1H P 50 G GLC PO SERPL-MCNC: 107 MG/DL (ref 40–134)
HCT VFR BLD AUTO: 38.4 % (ref 34.8–46.1)
HGB BLD-MCNC: 12.7 G/DL (ref 11.5–15.4)
IMM GRANULOCYTES # BLD AUTO: 0.14 THOUSAND/UL (ref 0–0.2)
IMM GRANULOCYTES NFR BLD AUTO: 1 % (ref 0–2)
LYMPHOCYTES # BLD AUTO: 1.81 THOUSANDS/ÂΜL (ref 0.6–4.47)
LYMPHOCYTES NFR BLD AUTO: 15 % (ref 14–44)
MCH RBC QN AUTO: 31.8 PG (ref 26.8–34.3)
MCHC RBC AUTO-ENTMCNC: 33.1 G/DL (ref 31.4–37.4)
MCV RBC AUTO: 96 FL (ref 82–98)
MONOCYTES # BLD AUTO: 0.43 THOUSAND/ÂΜL (ref 0.17–1.22)
MONOCYTES NFR BLD AUTO: 4 % (ref 4–12)
NEUTROPHILS # BLD AUTO: 9.89 THOUSANDS/ÂΜL (ref 1.85–7.62)
NEUTS SEG NFR BLD AUTO: 79 % (ref 43–75)
NRBC BLD AUTO-RTO: 0 /100 WBCS
PLATELET # BLD AUTO: 204 THOUSANDS/UL (ref 149–390)
PMV BLD AUTO: 10.4 FL (ref 8.9–12.7)
RBC # BLD AUTO: 4 MILLION/UL (ref 3.81–5.12)
TREPONEMA PALLIDUM IGG+IGM AB [PRESENCE] IN SERUM OR PLASMA BY IMMUNOASSAY: NORMAL
WBC # BLD AUTO: 12.41 THOUSAND/UL (ref 4.31–10.16)

## 2024-01-10 PROCEDURE — 82950 GLUCOSE TEST: CPT

## 2024-01-10 PROCEDURE — 85306 CLOT INHIBIT PROT S FREE: CPT | Performed by: OBSTETRICS & GYNECOLOGY

## 2024-01-10 PROCEDURE — 85613 RUSSELL VIPER VENOM DILUTED: CPT

## 2024-01-10 PROCEDURE — 85732 THROMBOPLASTIN TIME PARTIAL: CPT

## 2024-01-10 PROCEDURE — 86147 CARDIOLIPIN ANTIBODY EA IG: CPT

## 2024-01-10 PROCEDURE — 86780 TREPONEMA PALLIDUM: CPT

## 2024-01-10 PROCEDURE — 85301 ANTITHROMBIN III ANTIGEN: CPT

## 2024-01-10 PROCEDURE — 85303 CLOT INHIBIT PROT C ACTIVITY: CPT

## 2024-01-10 PROCEDURE — 86146 BETA-2 GLYCOPROTEIN ANTIBODY: CPT

## 2024-01-10 PROCEDURE — 85305 CLOT INHIBIT PROT S TOTAL: CPT | Performed by: OBSTETRICS & GYNECOLOGY

## 2024-01-10 PROCEDURE — 85705 THROMBOPLASTIN INHIBITION: CPT

## 2024-01-10 PROCEDURE — 85025 COMPLETE CBC W/AUTO DIFF WBC: CPT

## 2024-01-10 PROCEDURE — 36415 COLL VENOUS BLD VENIPUNCTURE: CPT

## 2024-01-10 PROCEDURE — 85670 THROMBIN TIME PLASMA: CPT

## 2024-01-12 LAB
AT III AG ACT/NOR PPP IA: 77 % (ref 72–124)
B2 GLYCOPROT1 IGA SERPL IA-ACNC: 1.8
B2 GLYCOPROT1 IGG SERPL IA-ACNC: 1.2
B2 GLYCOPROT1 IGM SERPL IA-ACNC: <2.4
CARDIOLIPIN IGA SER IA-ACNC: 1.3
CARDIOLIPIN IGG SER IA-ACNC: 1
CARDIOLIPIN IGM SER IA-ACNC: 1.5

## 2024-01-13 LAB
APTT SCREEN TO CONFIRM RATIO: 1.02 RATIO (ref 0–1.34)
CONFIRM APTT/NORMAL: 32.7 SEC (ref 0–47.6)
LA PPP-IMP: NORMAL
SCREEN APTT: 31.1 SEC (ref 0–43.5)
SCREEN DRVVT: 39.1 SEC (ref 0–47)
THROMBIN TIME: 15.2 SEC (ref 0–23)

## 2024-01-15 LAB — MISCELLANEOUS LAB TEST RESULT: NORMAL

## 2024-01-16 ENCOUNTER — ROUTINE PRENATAL (OUTPATIENT)
Facility: HOSPITAL | Age: 34
End: 2024-01-16
Attending: OBSTETRICS & GYNECOLOGY
Payer: COMMERCIAL

## 2024-01-16 VITALS
WEIGHT: 150 LBS | BODY MASS INDEX: 25.61 KG/M2 | DIASTOLIC BLOOD PRESSURE: 68 MMHG | HEART RATE: 92 BPM | HEIGHT: 64 IN | SYSTOLIC BLOOD PRESSURE: 108 MMHG | OXYGEN SATURATION: 99 %

## 2024-01-16 DIAGNOSIS — Z36.89 ENCOUNTER FOR FETAL ANATOMIC SURVEY: ICD-10-CM

## 2024-01-16 DIAGNOSIS — Z36.89 ENCOUNTER FOR ULTRASOUND TO ASSESS FETAL GROWTH: ICD-10-CM

## 2024-01-16 DIAGNOSIS — Z34.83 PRENATAL CARE, SUBSEQUENT PREGNANCY, THIRD TRIMESTER: ICD-10-CM

## 2024-01-16 DIAGNOSIS — Z3A.31 31 WEEKS GESTATION OF PREGNANCY: Primary | ICD-10-CM

## 2024-01-16 PROBLEM — O98.313 GENITAL HERPES AFFECTING PREGNANCY IN THIRD TRIMESTER: Status: ACTIVE | Noted: 2024-01-16

## 2024-01-16 PROBLEM — Z87.59 HISTORY OF PRIOR PREGNANCY WITH SMALL FOR GESTATIONAL AGE NEWBORN: Status: ACTIVE | Noted: 2024-01-16

## 2024-01-16 PROBLEM — Z3A.32 32 WEEKS GESTATION OF PREGNANCY: Status: ACTIVE | Noted: 2024-01-16

## 2024-01-16 PROBLEM — A60.09 GENITAL HERPES AFFECTING PREGNANCY IN THIRD TRIMESTER: Status: ACTIVE | Noted: 2024-01-16

## 2024-01-16 PROCEDURE — 76816 OB US FOLLOW-UP PER FETUS: CPT | Performed by: OBSTETRICS & GYNECOLOGY

## 2024-01-16 PROCEDURE — 99243 OFF/OP CNSLTJ NEW/EST LOW 30: CPT | Performed by: OBSTETRICS & GYNECOLOGY

## 2024-01-16 NOTE — PROGRESS NOTES
"[facility]: Ms. Carr was seen today at 31w6d for anatomic survey ultrasound.  See ultrasound report under \"OB Procedures\" tab.      My recommendations are as follows:  Although encouraging, even a normal-appearing ultrasound cannot exclude all malformations, or the possibility of a genetic syndrome. Follow-up ultrasound to evaluate fetal growth was advised in approximately four weeks given history of a small for gestational (SGA) baby. We briefly reviewed breech presentation, and management options including planned  versus external cephalic version (ECV) if persistent at term. She indicated she would not wish to pursue ECV. Jeannie has established prenatal care with [facility] since relocating. In addition to prenatal care, kick counting is advised.     Please don't hesitate to contact our office with any concerns or questions.    -Jeannie Huddleston MD  "

## 2024-01-16 NOTE — LETTER
"2024     Isi Wadsworth MD  [address]  [address]  [place] [address]    Patient: Jeannie Carr   YOB: 1990   Date of Visit: 2024       Dear Dr. Wadsworth:    Thank you for referring Jeannie Carr to me for evaluation. Below are my notes for this consultation.    If you have questions, please do not hesitate to call me. I look forward to following your patient along with you.         Sincerely,        Jeannie Huddleston MD        CC: No Recipients    Jeannie Huddleston MD  2024  9:30 AM  Sign when Signing Visit  [facility]: Ms. Carr was seen today at 31w6d for anatomic survey ultrasound.  See ultrasound report under \"OB Procedures\" tab.      My recommendations are as follows:  Although encouraging, even a normal-appearing ultrasound cannot exclude all malformations, or the possibility of a genetic syndrome. Follow-up ultrasound to evaluate fetal growth was advised in approximately four weeks given history of a small for gestational (SGA) baby. We briefly reviewed breech presentation, and management options including planned  versus external cephalic version (ECV) if persistent at term. She indicated she would not wish to pursue ECV. Jeannie has established prenatal care with [facility] since relocating. In addition to prenatal care, kick counting is advised.     Please don't hesitate to contact our office with any concerns or questions.    -Jeannie Huddleston MD  "

## 2024-02-01 ENCOUNTER — ROUTINE PRENATAL (OUTPATIENT)
Dept: OBGYN CLINIC | Facility: CLINIC | Age: 34
End: 2024-02-01
Payer: COMMERCIAL

## 2024-02-01 VITALS
DIASTOLIC BLOOD PRESSURE: 64 MMHG | SYSTOLIC BLOOD PRESSURE: 102 MMHG | BODY MASS INDEX: 26.47 KG/M2 | WEIGHT: 154.2 LBS | OXYGEN SATURATION: 98 % | HEART RATE: 96 BPM

## 2024-02-01 DIAGNOSIS — Z87.59 HISTORY OF PRIOR PREGNANCY WITH SMALL FOR GESTATIONAL AGE NEWBORN: ICD-10-CM

## 2024-02-01 DIAGNOSIS — Z3A.34 34 WEEKS GESTATION OF PREGNANCY: ICD-10-CM

## 2024-02-01 DIAGNOSIS — A60.09 GENITAL HERPES AFFECTING PREGNANCY IN THIRD TRIMESTER: ICD-10-CM

## 2024-02-01 DIAGNOSIS — O98.313 GENITAL HERPES AFFECTING PREGNANCY IN THIRD TRIMESTER: ICD-10-CM

## 2024-02-01 DIAGNOSIS — Z34.83 PRENATAL CARE, SUBSEQUENT PREGNANCY, THIRD TRIMESTER: Primary | ICD-10-CM

## 2024-02-01 DIAGNOSIS — Z23 ENCOUNTER FOR IMMUNIZATION: ICD-10-CM

## 2024-02-01 LAB
DME PARACHUTE DELIVERY DATE REQUESTED: NORMAL
DME PARACHUTE ITEM DESCRIPTION: NORMAL
DME PARACHUTE ORDER STATUS: NORMAL
DME PARACHUTE SUPPLIER NAME: NORMAL
DME PARACHUTE SUPPLIER PHONE: NORMAL

## 2024-02-01 PROCEDURE — 90715 TDAP VACCINE 7 YRS/> IM: CPT | Performed by: STUDENT IN AN ORGANIZED HEALTH CARE EDUCATION/TRAINING PROGRAM

## 2024-02-01 PROCEDURE — 90471 IMMUNIZATION ADMIN: CPT | Performed by: STUDENT IN AN ORGANIZED HEALTH CARE EDUCATION/TRAINING PROGRAM

## 2024-02-01 PROCEDURE — PNV: Performed by: STUDENT IN AN ORGANIZED HEALTH CARE EDUCATION/TRAINING PROGRAM

## 2024-02-01 RX ORDER — VALACYCLOVIR HYDROCHLORIDE 500 MG/1
500 TABLET, FILM COATED ORAL 2 TIMES DAILY
Qty: 60 TABLET | Refills: 1 | Status: SHIPPED | OUTPATIENT
Start: 2024-02-01 | End: 2024-04-01

## 2024-02-01 NOTE — ASSESSMENT & PLAN NOTE
-discussed risks, benefits, alternatives of primary LTCS vs ECV, pt would like to decide after repeat US  -repeat US scheduled 2/13/24

## 2024-02-01 NOTE — ASSESSMENT & PLAN NOTE
34yo  at 34.1wks presents for routine prenatal visit    Denies regular painful contractions, vaginal bleeding, leakage of fluid. Endorses fetal movement.     Patient was counseled about the labor process. Patient was counseled about possible need for operative delivery via vacuum or forceps. Indications for operative delivery discussed. Risks dicussed including but not limited to increased maternal risk of more severe laceration and small risk of  complications of intracranial hemorrhage, lacerations, nerve damage or fracture.     Discussed with patient potential indications of need for  section including arrest of dilation/descent, non-reassuring fetal status, or worsening maternal status. Risks of  section discussed including but not limited to infection, bleeding, injury to the surrounding organs including bowel and bladder. Medical and surgical management of post partum bleeding discussed. Risk of blood transfusion discussed, patient okay with receiving blood transfusion if necessary.     Patient verbalized understanding. All questions answered. Patient signed consent.     -continue PNVs   -s/p tdap   -28 wk labs reviewed, wnl  -delivery consent signed, breast pump ordered, has pediatrician   - labor precautions provided  -return in 2 weeks

## 2024-02-01 NOTE — PROGRESS NOTES
34 weeks gestation of pregnancy  34yo  at 34.1wks presents for routine prenatal visit    Denies regular painful contractions, vaginal bleeding, leakage of fluid. Endorses fetal movement.     Patient was counseled about the labor process. Patient was counseled about possible need for operative delivery via vacuum or forceps. Indications for operative delivery discussed. Risks dicussed including but not limited to increased maternal risk of more severe laceration and small risk of  complications of intracranial hemorrhage, lacerations, nerve damage or fracture.     Discussed with patient potential indications of need for  section including arrest of dilation/descent, non-reassuring fetal status, or worsening maternal status. Risks of  section discussed including but not limited to infection, bleeding, injury to the surrounding organs including bowel and bladder. Medical and surgical management of post partum bleeding discussed. Risk of blood transfusion discussed, patient okay with receiving blood transfusion if necessary.     Patient verbalized understanding. All questions answered. Patient signed consent.     -continue PNVs   -s/p tdap   -28 wk labs reviewed, wnl  -delivery consent signed, breast pump ordered, has pediatrician   - labor precautions provided  -return in 2 weeks     History of prior pregnancy with small for gestational age   -31.6wks growth: 1787 grams - 3 lbs 15 oz (28%)     Breech presentation  -discussed risks, benefits, alternatives of primary LTCS vs ECV, pt would like to decide after repeat US  -repeat US scheduled 24    Genital herpes affecting pregnancy in third trimester  -valtrex ordered to be started at 36wks

## 2024-02-12 ENCOUNTER — ULTRASOUND (OUTPATIENT)
Facility: HOSPITAL | Age: 34
End: 2024-02-12
Payer: COMMERCIAL

## 2024-02-12 ENCOUNTER — TELEPHONE (OUTPATIENT)
Facility: HOSPITAL | Age: 34
End: 2024-02-12

## 2024-02-12 VITALS
HEIGHT: 64 IN | SYSTOLIC BLOOD PRESSURE: 124 MMHG | DIASTOLIC BLOOD PRESSURE: 70 MMHG | WEIGHT: 157 LBS | HEART RATE: 76 BPM | BODY MASS INDEX: 26.8 KG/M2

## 2024-02-12 DIAGNOSIS — Z3A.35 35 WEEKS GESTATION OF PREGNANCY: ICD-10-CM

## 2024-02-12 DIAGNOSIS — Z87.59 HISTORY OF PRIOR PREGNANCY WITH SMALL FOR GESTATIONAL AGE NEWBORN: Primary | ICD-10-CM

## 2024-02-12 PROCEDURE — 76816 OB US FOLLOW-UP PER FETUS: CPT | Performed by: OBSTETRICS & GYNECOLOGY

## 2024-02-12 NOTE — PROGRESS NOTES
The patient was seen today for an ultrasound.  Please see ultrasound report (located under Ob Procedures) for additional details.   Thank you very much for allowing us to participate in the care of this very nice patient.  Should you have any questions, please do not hesitate to contact me.     Rohit Christie MD FACOG  Attending Physician, Maternal-Fetal Medicine  [facility]

## 2024-02-12 NOTE — TELEPHONE ENCOUNTER
Left voicemail for pt that we are cancelling 2/12 appointment due to expected inclement weather.  We will call pt back to reschedule.

## 2024-02-15 ENCOUNTER — ROUTINE PRENATAL (OUTPATIENT)
Dept: OBGYN CLINIC | Facility: CLINIC | Age: 34
End: 2024-02-15
Payer: COMMERCIAL

## 2024-02-15 VITALS
SYSTOLIC BLOOD PRESSURE: 118 MMHG | WEIGHT: 157.4 LBS | BODY MASS INDEX: 27.02 KG/M2 | HEART RATE: 110 BPM | DIASTOLIC BLOOD PRESSURE: 84 MMHG

## 2024-02-15 DIAGNOSIS — Z87.59 HISTORY OF PRIOR PREGNANCY WITH SMALL FOR GESTATIONAL AGE NEWBORN: ICD-10-CM

## 2024-02-15 DIAGNOSIS — Z34.83 PRENATAL CARE, SUBSEQUENT PREGNANCY, THIRD TRIMESTER: Primary | ICD-10-CM

## 2024-02-15 DIAGNOSIS — O98.313 GENITAL HERPES AFFECTING PREGNANCY IN THIRD TRIMESTER: ICD-10-CM

## 2024-02-15 DIAGNOSIS — A60.09 GENITAL HERPES AFFECTING PREGNANCY IN THIRD TRIMESTER: ICD-10-CM

## 2024-02-15 DIAGNOSIS — Z3A.36 36 WEEKS GESTATION OF PREGNANCY: ICD-10-CM

## 2024-02-15 LAB
SL AMB  POCT GLUCOSE, UA: NEGATIVE
SL AMB POCT URINE PROTEIN: ABNORMAL

## 2024-02-15 PROCEDURE — 87150 DNA/RNA AMPLIFIED PROBE: CPT | Performed by: STUDENT IN AN ORGANIZED HEALTH CARE EDUCATION/TRAINING PROGRAM

## 2024-02-15 PROCEDURE — PNV: Performed by: STUDENT IN AN ORGANIZED HEALTH CARE EDUCATION/TRAINING PROGRAM

## 2024-02-15 PROCEDURE — 81002 URINALYSIS NONAUTO W/O SCOPE: CPT | Performed by: STUDENT IN AN ORGANIZED HEALTH CARE EDUCATION/TRAINING PROGRAM

## 2024-02-15 NOTE — ASSESSMENT & PLAN NOTE
34 yo  at 36+1 here for routine ob visit. More pressure and occasional contracitons or cramping. Nothing regular. No leaking or bleeding. Good fetal movement. Found out she's having a surprise baby shower Saturday. Having second boy. GBS collected, cvx ft/70/-3. Return in 1 wk.

## 2024-02-15 NOTE — PROGRESS NOTES
36 weeks gestation of pregnancy  34 yo  at 36+1 here for routine ob visit. More pressure and occasional contracitons or cramping. Nothing regular. No leaking or bleeding. Good fetal movement. Found out she's having a surprise baby shower Saturday. Having second boy. GBS collected, cvx ft/70/-3. Return in 1 wk.    Genital herpes affecting pregnancy in third trimester  On valtrex    History of prior pregnancy with small for gestational age   AGA at scan .

## 2024-02-15 NOTE — PROGRESS NOTES
Patient here for prenatal visit.  GA: 36w1d    Denies leaking of fluid, bleeding  Patient had significant cramping Tuesday night into Wednesday and some on Thursday. It has stopped since then.   Normal Fetal Movement  Urine is trace protein/neg glucose  Labs/Tdap-Utd  Declined Flu vaccine  Delivery Consent signed 24  Breast Pump ordered previously  Has Pediatrician  GBS collected today

## 2024-02-16 ENCOUNTER — HOSPITAL ENCOUNTER (OUTPATIENT)
Facility: HOSPITAL | Age: 34
Discharge: HOME | End: 2024-02-16
Attending: STUDENT IN AN ORGANIZED HEALTH CARE EDUCATION/TRAINING PROGRAM | Admitting: STUDENT IN AN ORGANIZED HEALTH CARE EDUCATION/TRAINING PROGRAM
Payer: COMMERCIAL

## 2024-02-16 VITALS
BODY MASS INDEX: 26.46 KG/M2 | SYSTOLIC BLOOD PRESSURE: 126 MMHG | WEIGHT: 155 LBS | HEART RATE: 82 BPM | TEMPERATURE: 99 F | OXYGEN SATURATION: 98 % | DIASTOLIC BLOOD PRESSURE: 75 MMHG | HEIGHT: 64 IN | RESPIRATION RATE: 16 BRPM

## 2024-02-16 PROBLEM — O47.9 UTERINE CONTRACTIONS DURING PREGNANCY: Status: ACTIVE | Noted: 2024-02-16

## 2024-02-16 PROCEDURE — 99214 OFFICE O/P EST MOD 30 MIN: CPT

## 2024-02-16 PROCEDURE — NC001 PR NO CHARGE: Performed by: STUDENT IN AN ORGANIZED HEALTH CARE EDUCATION/TRAINING PROGRAM

## 2024-02-16 NOTE — PROGRESS NOTES
L&D Triage Note - OB/GYN  Jeannie Carr 33 y.o. female MRN: [id number]  Unit/Bed#: LD TRIAGE  Encounter: [id number]      Assessment:  33 y.o.  at 36w2d presenting with complaint of nausea, vomiting and contractions. Determined not to be in labor and discharged home with labor precautions    Plan:  1. GI upset   Denied N/V/D on presentation   Declined anti-emetics  2. Contractions   NST reactive   Cervical exam unchanged (0.550/-4)   Labor precautions reviewed  3. Disposition   Discharge home   D/W Dr. Padron    ______________________________________________________________________      Chief Complaint: contractions    Subjective:  33 y.o.  at 36w2d presenting with irregular contractions, and one episode of nausea, vomiting and diarrhea. She states this began around 8 pm. She has since been able to tolerate PO and has not had any more diarrhea. She denies LOF, VB and decreased FM. Pregnancy complicated by GERD, IBS, genital HSV and h/o PPROM.     ROS:   Review of Systems   Constitutional:  Negative for chills and fever.   HENT:  Negative for ear pain and sore throat.    Eyes:  Negative for pain and visual disturbance.   Respiratory:  Negative for cough and shortness of breath.    Cardiovascular:  Negative for chest pain and palpitations.   Gastrointestinal:  Positive for diarrhea, nausea and vomiting. Negative for abdominal pain.   Genitourinary:  Negative for dysuria and hematuria.   Musculoskeletal:  Negative for arthralgias and back pain.   Skin:  Negative for color change and rash.   Neurological:  Negative for seizures and syncope.   All other systems reviewed and are negative.      Objective:  Vitals:    24 0402   BP: 126/75   Pulse: 82   Resp: 16   Temp: 99 °F (37.2 °C)   SpO2: 98%       Physical Exam  Constitutional:       Appearance: Normal appearance.   Genitourinary:      Vulva normal.   Cardiovascular:      Pulses: Normal pulses.   Pulmonary:      Effort: Pulmonary effort is  normal. No respiratory distress.   Abdominal:      Palpations: Abdomen is soft.      Tenderness: There is no abdominal tenderness.   Neurological:      Mental Status: She is alert.   Skin:     General: Skin is warm and dry.   Psychiatric:         Mood and Affect: Mood normal.         Behavior: Behavior normal.   Vitals reviewed.          SVE: 0.5 / 50% / -4   FHT:  reactive  [place]: irregular (q6min)    Jacqui Reyes MD 2/16/2024 4:36 AM

## 2024-02-17 ENCOUNTER — HOSPITAL ENCOUNTER (OUTPATIENT)
Facility: HOSPITAL | Age: 34
Discharge: HOME | End: 2024-02-17
Attending: OBSTETRICS & GYNECOLOGY | Admitting: OBSTETRICS & GYNECOLOGY
Payer: COMMERCIAL

## 2024-02-17 ENCOUNTER — NURSE TRIAGE (OUTPATIENT)
Dept: OTHER | Facility: OTHER | Age: 34
End: 2024-02-17

## 2024-02-17 VITALS
TEMPERATURE: 97.9 F | OXYGEN SATURATION: 96 % | HEIGHT: 64 IN | BODY MASS INDEX: 26.46 KG/M2 | WEIGHT: 155 LBS | HEART RATE: 67 BPM | RESPIRATION RATE: 18 BRPM | DIASTOLIC BLOOD PRESSURE: 73 MMHG | SYSTOLIC BLOOD PRESSURE: 117 MMHG

## 2024-02-17 PROBLEM — R11.2 NAUSEA & VOMITING: Status: ACTIVE | Noted: 2024-02-17

## 2024-02-17 LAB
ALBUMIN SERPL BCP-MCNC: 3.4 G/DL (ref 3.5–5)
ALP SERPL-CCNC: 205 U/L (ref 34–104)
ALT SERPL W P-5'-P-CCNC: 7 U/L (ref 7–52)
AMYLASE SERPL-CCNC: 21 IU/L (ref 29–103)
ANION GAP SERPL CALCULATED.3IONS-SCNC: 7 MMOL/L
AST SERPL W P-5'-P-CCNC: 11 U/L (ref 13–39)
BASOPHILS # BLD AUTO: 0.02 THOUSANDS/ÂΜL (ref 0–0.1)
BASOPHILS NFR BLD AUTO: 0 % (ref 0–1)
BILIRUB SERPL-MCNC: 0.35 MG/DL (ref 0.2–1)
BUN SERPL-MCNC: 11 MG/DL (ref 5–25)
CALCIUM ALBUM COR SERPL-MCNC: 9.2 MG/DL (ref 8.3–10.1)
CALCIUM SERPL-MCNC: 8.7 MG/DL (ref 8.4–10.2)
CHLORIDE SERPL-SCNC: 108 MMOL/L (ref 96–108)
CO2 SERPL-SCNC: 19 MMOL/L (ref 21–32)
CREAT SERPL-MCNC: 0.68 MG/DL (ref 0.6–1.3)
EOSINOPHIL # BLD AUTO: 0.16 THOUSAND/ÂΜL (ref 0–0.61)
EOSINOPHIL NFR BLD AUTO: 1 % (ref 0–6)
ERYTHROCYTE [DISTWIDTH] IN BLOOD BY AUTOMATED COUNT: 11.9 % (ref 11.6–15.1)
GFR SERPL CREATININE-BSD FRML MDRD: 116 ML/MIN/1.73SQ M
GLUCOSE SERPL-MCNC: 81 MG/DL (ref 65–140)
HCT VFR BLD AUTO: 37.5 % (ref 34.8–46.1)
HGB BLD-MCNC: 12.5 G/DL (ref 11.5–15.4)
IMM GRANULOCYTES # BLD AUTO: 0.06 THOUSAND/UL (ref 0–0.2)
IMM GRANULOCYTES NFR BLD AUTO: 1 % (ref 0–2)
LIPASE SERPL-CCNC: 10 U/L (ref 11–82)
LYMPHOCYTES # BLD AUTO: 1.72 THOUSANDS/ÂΜL (ref 0.6–4.47)
LYMPHOCYTES NFR BLD AUTO: 15 % (ref 14–44)
MCH RBC QN AUTO: 30.9 PG (ref 26.8–34.3)
MCHC RBC AUTO-ENTMCNC: 33.3 G/DL (ref 31.4–37.4)
MCV RBC AUTO: 93 FL (ref 82–98)
MONOCYTES # BLD AUTO: 0.54 THOUSAND/ÂΜL (ref 0.17–1.22)
MONOCYTES NFR BLD AUTO: 5 % (ref 4–12)
NEUTROPHILS # BLD AUTO: 8.94 THOUSANDS/ÂΜL (ref 1.85–7.62)
NEUTS SEG NFR BLD AUTO: 78 % (ref 43–75)
NRBC BLD AUTO-RTO: 0 /100 WBCS
PLATELET # BLD AUTO: 181 THOUSANDS/UL (ref 149–390)
PMV BLD AUTO: 11.1 FL (ref 8.9–12.7)
POTASSIUM SERPL-SCNC: 4.1 MMOL/L (ref 3.5–5.3)
PROT SERPL-MCNC: 6.3 G/DL (ref 6.4–8.4)
RBC # BLD AUTO: 4.04 MILLION/UL (ref 3.81–5.12)
SODIUM SERPL-SCNC: 134 MMOL/L (ref 135–147)
WBC # BLD AUTO: 11.44 THOUSAND/UL (ref 4.31–10.16)

## 2024-02-17 PROCEDURE — 83690 ASSAY OF LIPASE: CPT

## 2024-02-17 PROCEDURE — NC001 PR NO CHARGE: Performed by: STUDENT IN AN ORGANIZED HEALTH CARE EDUCATION/TRAINING PROGRAM

## 2024-02-17 PROCEDURE — 85025 COMPLETE CBC W/AUTO DIFF WBC: CPT

## 2024-02-17 PROCEDURE — 82150 ASSAY OF AMYLASE: CPT

## 2024-02-17 PROCEDURE — 80053 COMPREHEN METABOLIC PANEL: CPT

## 2024-02-17 PROCEDURE — 99214 OFFICE O/P EST MOD 30 MIN: CPT

## 2024-02-17 RX ORDER — ONDANSETRON 2 MG/ML
4 INJECTION INTRAMUSCULAR; INTRAVENOUS EVERY 4 HOURS PRN
Status: DISCONTINUED | OUTPATIENT
Start: 2024-02-17 | End: 2024-02-17 | Stop reason: HOSPADM

## 2024-02-17 RX ADMIN — SODIUM CHLORIDE, SODIUM LACTATE, POTASSIUM CHLORIDE, AND CALCIUM CHLORIDE 1000 ML: .6; .31; .03; .02 INJECTION, SOLUTION INTRAVENOUS at 07:22

## 2024-02-17 NOTE — TELEPHONE ENCOUNTER
Gestation:36W2D  SAMANTHA:3/13    c/o intermittent upper mid abdominal pain, and RUQ pain rated 7/10. Onset  but worsening since 200. Pain radiated to upper back. Not relieved with antacids.  Also reports severe diarrhea, and nausea.   Baseline FM. No additional symptoms.     On call provider contacted and informed of patient's concerns and status.    Provider recommends coming in for evaluation.      Patient informed of provider's recommendation. Verbalized understanding. Agreeable with disposition. No further questions.      L&D charge nurse notified.

## 2024-02-17 NOTE — TELEPHONE ENCOUNTER
"Regardin weeks/ diarreha, stomach pain  ----- Message from Keith Sandoval sent at 2024  4:57 AM EST -----  \" I am 36 weeks experiencing sever upper abdominal stomach pain, nausea, diarrhea. \"    "

## 2024-02-17 NOTE — TELEPHONE ENCOUNTER
"Reason for Disposition  • MODERATE-SEVERE abdominal pain (e.g., interferes with normal activities, awakens from sleep)    Answer Assessment - Initial Assessment Questions  1. LOCATION: \"Where does it hurt?\"       Upper mid abdominal, and RUQ  2. RADIATION: \"Does the pain shoot anywhere else?\" (e.g., chest, back)      Back   3. ONSET: \"When did the pain begin?\" (Minutes, hours or days ago)     2/13, but worsening since 0200 on 2/17  4. ONSET: \"Gradual or sudden onset?\"      Gradual   5. PATTERN: \"Does the pain come and go, or has it been constant since it started?\"       Intermittent   6. SEVERITY: \"How bad is the pain?\" \"What does it keep you from doing?\"  (e.g., Scale 1-10; mild, moderate, or severe)    - MILD (1-3): doesn't interfere with normal activities, abdomen soft and not tender to touch     - MODERATE (4-7): interferes with normal activities or awakens from sleep, tender to touch     - SEVERE (8-10): excruciating pain, doubled over, unable to do any normal activities      7/10  7. RECURRENT SYMPTOM: \"Have you ever had this type of stomach pain before?\" If Yes, ask: \"When was the last time?\" and \"What happened that time?\"      Denies   8. CAUSE: \"What do you think is causing the stomach pain?     Unsure   9. RELIEVING/AGGRAVATING FACTORS: \"What makes it better or worse?\" (e.g., antacids, bowel movement, movement)      Unsure . Antacids not effective  10. FETAL MOVEMENT: \"Has the baby's movement decreased or changed significantly from normal?\"        Baseline   11. OTHER SYMPTOMS: \"Has there been any vaginal bleeding, fever, vomiting, diarrhea, or urine problems?\"        Nausea, diarrhea (>20 times/day), chills   12. SAMANTHA: \"What date are you expecting to deliver?\"        3/13    Protocols used: Pregnancy - Abdominal Pain Greater Than 20 Weeks EGA-ADULT-AH    "

## 2024-02-17 NOTE — PROGRESS NOTES
L&D Triage Note - OB/GYN  Jeannie Carr 33 y.o. female MRN: [id number]  Unit/Bed#:  TRIAGE 3-01 Encounter: [id number]    Patient is [facility] by [facility]    ASSESSMENT  Jeannie Carr is a 33 y.o.  at 36w3d presenting with nausea, intermittent vomiting, diarrhea and upper abdominal pain. She denies obstetric complaints and adamantly denies contractions. Reports this pain is different and is afraid the pain is related to her gallbladder. Reports that her son as well as mother and father have all had GI issues in the past few days including diarrhea and vomiting. Likely viral gastritis.     PLAN  #1. Rule out labor:   SVE 1/0/-3, patient not feeling contractions, contractions q3-5 min on toco    #2. Rule out cholelithiasis:   Negative ibanez's sign  No abdominal tenderness on exam  CBC, CMP, amylase and Lipase wnl  Patient declines zofran for nausea    #3. Viral gastritis:  IV fluids  Patient declines zofran  Encourage PO hydration and use of pepto bismol as needed     Discharge instructions  Patient instructed to call if experiencing worsening contractions, vaginal bleeding, loss of fluid or decreased fetal movement.  Will follow up with OBGYN on 24.    D/w Dr. Arce  ______________    SUBJECTIVE    SAMANTHA: Estimated Date of Delivery: 3/13/24    HPI:  33 y.o.  36w3d presents with complaint of  upper abdominal pain, n/v, diarrhea since yesterday. Her son, mother and father have all had similar symptoms in the past few days including vomiting and diarrhea. Patient has had intermittent vomiting, but is able to tolerate PO. She declines Zofran for nausea. She adamantly denies obstetric complaints.     Contractions: Denies  Leakage of fluid: Denies  Vaginal Bleeding: Denies  Fetal movement: present    Her obstetrical history is significant for HSV, hx of SGA , hx of  delivery    ROS:  Constitutional: Negative  Respiratory: Negative  Cardiovascular: Negative    Gastrointestinal: Nausea,  "vomiting, diarrhea     OBJECTIVE:    Vitals:  /73 (BP Location: Right arm)   Pulse 67   Temp 97.9 °F (36.6 °C) (Oral)   Resp 18   Ht 5' 4\" (1.626 m)   Wt 70.3 kg (155 lb)   LMP 06/07/2023 (Exact Date)   SpO2 96%   BMI 26.61 kg/m²   Body mass index is 26.61 kg/m².    Physical Exam  Vitals reviewed. Exam conducted with a chaperone present.   Constitutional:       General: She is not in acute distress.  HENT:      Head: Normocephalic and atraumatic.      Nose: Nose normal.      Mouth/Throat:      Pharynx: Oropharynx is clear.   Eyes:      General: No scleral icterus.     Conjunctiva/sclera: Conjunctivae normal.   Cardiovascular:      Rate and Rhythm: Normal rate.   Pulmonary:      Effort: Pulmonary effort is normal. No respiratory distress.   Abdominal:      Palpations: Abdomen is soft.      Tenderness: There is no abdominal tenderness.      Comments: Gravid  Negative ibanez's sign   Musculoskeletal:         General: No tenderness.      Right lower leg: No edema.      Left lower leg: No edema.   Skin:     General: Skin is warm and dry.      Coloration: Skin is not jaundiced.      Findings: No bruising.   Neurological:      Mental Status: She is alert.   Psychiatric:         Mood and Affect: Mood normal.         Behavior: Behavior normal.         SVE:  1/0/-3    FHT:  Baseline Rate (FHR): 135 bpm  Variability: Moderate  Accelerations: 15 x 15 or greater  Decelerations: None    TOCO:   Contraction Frequency (minutes): 3  Contraction Duration (seconds): 60-80  Contraction Intensity: Mild    Labs:   Recent Results (from the past 24 hour(s))   Comprehensive metabolic panel    Collection Time: 02/17/24  7:21 AM   Result Value Ref Range    Sodium 134 (L) 135 - 147 mmol/L    Potassium 4.1 3.5 - 5.3 mmol/L    Chloride 108 96 - 108 mmol/L    CO2 19 (L) 21 - 32 mmol/L    ANION GAP 7 mmol/L    BUN 11 5 - 25 mg/dL    Creatinine 0.68 0.60 - 1.30 mg/dL    Glucose 81 65 - 140 mg/dL    Calcium 8.7 8.4 - 10.2 mg/dL    " Corrected Calcium 9.2 8.3 - 10.1 mg/dL    AST 11 (L) 13 - 39 U/L    ALT 7 7 - 52 U/L    Alkaline Phosphatase 205 (H) 34 - 104 U/L    Total Protein 6.3 (L) 6.4 - 8.4 g/dL    Albumin 3.4 (L) 3.5 - 5.0 g/dL    Total Bilirubin 0.35 0.20 - 1.00 mg/dL    eGFR 116 ml/min/1.73sq m   Amylase    Collection Time: 02/17/24  7:21 AM   Result Value Ref Range    Amylase 21 (L) 29 - 103 IU/L   Lipase    Collection Time: 02/17/24  7:21 AM   Result Value Ref Range    Lipase 10 (L) 11 - 82 u/L   CBC and differential    Collection Time: 02/17/24  7:21 AM   Result Value Ref Range    WBC 11.44 (H) 4.31 - 10.16 Thousand/uL    RBC 4.04 3.81 - 5.12 Million/uL    Hemoglobin 12.5 11.5 - 15.4 g/dL    Hematocrit 37.5 34.8 - 46.1 %    MCV 93 82 - 98 fL    MCH 30.9 26.8 - 34.3 pg    MCHC 33.3 31.4 - 37.4 g/dL    RDW 11.9 11.6 - 15.1 %    MPV 11.1 8.9 - 12.7 fL    Platelets 181 149 - 390 Thousands/uL    nRBC 0 /100 WBCs    Neutrophils Relative 78 (H) 43 - 75 %    Immat GRANS % 1 0 - 2 %    Lymphocytes Relative 15 14 - 44 %    Monocytes Relative 5 4 - 12 %    Eosinophils Relative 1 0 - 6 %    Basophils Relative 0 0 - 1 %    Neutrophils Absolute 8.94 (H) 1.85 - 7.62 Thousands/µL    Immature Grans Absolute 0.06 0.00 - 0.20 Thousand/uL    Lymphocytes Absolute 1.72 0.60 - 4.47 Thousands/µL    Monocytes Absolute 0.54 0.17 - 1.22 Thousand/µL    Eosinophils Absolute 0.16 0.00 - 0.61 Thousand/µL    Basophils Absolute 0.02 0.00 - 0.10 Thousands/µL           Jossy Pascal MD  2/17/2024  8:46 AM

## 2024-02-18 LAB
DME PARACHUTE DELIVERY DATE ACTUAL: NORMAL
DME PARACHUTE DELIVERY DATE REQUESTED: NORMAL
DME PARACHUTE ITEM DESCRIPTION: NORMAL
DME PARACHUTE ORDER STATUS: NORMAL
DME PARACHUTE SUPPLIER NAME: NORMAL
DME PARACHUTE SUPPLIER PHONE: NORMAL
GP B STREP DNA SPEC QL NAA+PROBE: NEGATIVE

## 2024-02-23 ENCOUNTER — ROUTINE PRENATAL (OUTPATIENT)
Dept: OBGYN CLINIC | Facility: CLINIC | Age: 34
End: 2024-02-23
Payer: COMMERCIAL

## 2024-02-23 VITALS
SYSTOLIC BLOOD PRESSURE: 118 MMHG | WEIGHT: 158.4 LBS | BODY MASS INDEX: 27.19 KG/M2 | HEART RATE: 84 BPM | DIASTOLIC BLOOD PRESSURE: 82 MMHG

## 2024-02-23 DIAGNOSIS — Z34.83 PRENATAL CARE, SUBSEQUENT PREGNANCY, THIRD TRIMESTER: ICD-10-CM

## 2024-02-23 DIAGNOSIS — A60.09 GENITAL HERPES AFFECTING PREGNANCY IN THIRD TRIMESTER: ICD-10-CM

## 2024-02-23 DIAGNOSIS — Z3A.37 37 WEEKS GESTATION OF PREGNANCY: Primary | ICD-10-CM

## 2024-02-23 DIAGNOSIS — O98.313 GENITAL HERPES AFFECTING PREGNANCY IN THIRD TRIMESTER: ICD-10-CM

## 2024-02-23 LAB
SL AMB  POCT GLUCOSE, UA: NEGATIVE
SL AMB POCT URINE PROTEIN: ABNORMAL

## 2024-02-23 PROCEDURE — PNV: Performed by: OBSTETRICS & GYNECOLOGY

## 2024-02-23 PROCEDURE — 81002 URINALYSIS NONAUTO W/O SCOPE: CPT | Performed by: OBSTETRICS & GYNECOLOGY

## 2024-02-23 RX ORDER — VALACYCLOVIR HYDROCHLORIDE 500 MG/1
500 TABLET, FILM COATED ORAL 2 TIMES DAILY
Qty: 60 TABLET | Refills: 0 | Status: SHIPPED | OUTPATIENT
Start: 2024-02-23 | End: 2024-03-24

## 2024-02-23 NOTE — PROGRESS NOTES
Problem List Items Addressed This Visit       37 weeks gestation of pregnancy - Primary     Patient feeling well- no complaints. Denies ctx, vb and lof. + FM. Still has some lower abdominal cramping- nothing regular  Declines SVE today  GBS negative   Precautions reviewed, f/u in 1 week.            Genital herpes affecting pregnancy in third trimester     Valtrex suppression previously ordered, but forgot to . Reordered for her today- discussed importance of starting to prevent outbreak surrounding delivery. She expressed understanding and still start today.         Relevant Medications    valACYclovir (VALTREX) 500 mg tablet     Other Visit Diagnoses       Prenatal care, subsequent pregnancy, third trimester        Relevant Orders    POCT urine dip (Completed)            Jeannie Carr is a 33 y.o.  at 37w2d who presents today for routine prenatal visit.     /82 (BP Location: Left arm, Patient Position: Sitting, Cuff Size: Standard)   Pulse 84   Wt 71.8 kg (158 lb 6.4 oz)   LMP 2023 (Exact Date)   BMI 27.19 kg/m²     FHR: 150  FH: 36 cm

## 2024-02-23 NOTE — PROGRESS NOTES
Prenatal visit 37w2d  Denies bleeding, leakage of fluid. Normal fetal movement.  Has some cramping, nothing consistent  GBS negative  Delivery consent previously signed   Breast pump received  S/p Tdap  Patient was seen at L&D triage for viral symptoms - has since improved

## 2024-02-23 NOTE — ASSESSMENT & PLAN NOTE
Valtrex suppression previously ordered, but forgot to . Reordered for her today- discussed importance of starting to prevent outbreak surrounding delivery. She expressed understanding and still start today.

## 2024-02-23 NOTE — ASSESSMENT & PLAN NOTE
Patient feeling well- no complaints. Denies ctx, vb and lof. + FM. Still has some lower abdominal cramping- nothing regular  Declines SVE today  GBS negative   Precautions reviewed, f/u in 1 week.

## 2024-02-28 ENCOUNTER — ROUTINE PRENATAL (OUTPATIENT)
Dept: OBGYN CLINIC | Facility: CLINIC | Age: 34
End: 2024-02-28
Payer: COMMERCIAL

## 2024-02-28 ENCOUNTER — TELEPHONE (OUTPATIENT)
Dept: OBGYN CLINIC | Facility: CLINIC | Age: 34
End: 2024-02-28

## 2024-02-28 VITALS
DIASTOLIC BLOOD PRESSURE: 74 MMHG | WEIGHT: 162.2 LBS | OXYGEN SATURATION: 97 % | SYSTOLIC BLOOD PRESSURE: 120 MMHG | HEART RATE: 92 BPM | BODY MASS INDEX: 27.84 KG/M2

## 2024-02-28 DIAGNOSIS — O98.313 GENITAL HERPES AFFECTING PREGNANCY IN THIRD TRIMESTER: ICD-10-CM

## 2024-02-28 DIAGNOSIS — A60.09 GENITAL HERPES AFFECTING PREGNANCY IN THIRD TRIMESTER: ICD-10-CM

## 2024-02-28 DIAGNOSIS — Z34.83 PRENATAL CARE, SUBSEQUENT PREGNANCY, THIRD TRIMESTER: Primary | ICD-10-CM

## 2024-02-28 DIAGNOSIS — Z3A.38 38 WEEKS GESTATION OF PREGNANCY: ICD-10-CM

## 2024-02-28 LAB
SL AMB  POCT GLUCOSE, UA: NORMAL
SL AMB POCT URINE PROTEIN: NORMAL

## 2024-02-28 PROCEDURE — PNV: Performed by: STUDENT IN AN ORGANIZED HEALTH CARE EDUCATION/TRAINING PROGRAM

## 2024-02-28 PROCEDURE — 81002 URINALYSIS NONAUTO W/O SCOPE: CPT | Performed by: STUDENT IN AN ORGANIZED HEALTH CARE EDUCATION/TRAINING PROGRAM

## 2024-02-28 NOTE — ASSESSMENT & PLAN NOTE
-pt has not yet started valtrex. She is planning to pick it up today. Encouraged to start suppressive therapy. Pt  verbalized understanding.

## 2024-02-28 NOTE — ASSESSMENT & PLAN NOTE
32yo  at 38.0wks presents for routine prenatal visit     Pt denies contractions, vaginal bleeding, leakage of fluid. Endorses fetal movement.     -continue PNVs   -s/p Tdap   -induction consent signed today for 41wk induction of labor, request sent   -GBS negative   -SVE /-3  -labor precautions provided   -return in 1 week

## 2024-02-28 NOTE — TELEPHONE ENCOUNTER
----- Message from Trinidad Wen DO sent at 2/28/2024  9:19 AM EST -----  Regarding: IOL schedule  Procedure to be scheduled: IOL   SAMANTHA: Estimated Date of Delivery: 3/13/24  Indication for delivery: late term  Requested date (s) of delivery: 3/19         If requested date is unavailable, is there a date by which the pt must be delivered?  Physician preference: none     If IOL, anticipated method: santos pit     Last visit: 6/7/23  Next visit: 6/21/23    Medication last prescribed 5/17/23 topiramate (TOPAMAX) 25 MG tablet #106 R-0, Take 1 tablet by mouth 2 times daily for 7 days, THEN 2 tablets 2 times daily for 23 days.    Verified prescription in Providers note.    PROTOCOL APPROVED. Order prepped per providers note.

## 2024-02-28 NOTE — PROGRESS NOTES
38 weeks gestation of pregnancy  34yo  at 38.0wks presents for routine prenatal visit     Pt denies contractions, vaginal bleeding, leakage of fluid. Endorses fetal movement.     -continue PNVs   -s/p Tdap   -induction consent signed today for 41wk induction of labor, request sent   -GBS negative   -SVE /3  -labor precautions provided   -return in 1 week     Genital herpes affecting pregnancy in third trimester  -pt has not yet started valtrex. She is planning to pick it up today. Encouraged to start suppressive therapy. Pt  verbalized understanding.

## 2024-02-28 NOTE — TELEPHONE ENCOUNTER
IOL Scheduled for 3/20 8pm KTM    LEFT MESSAGE    Patient notified of IOL date,time,and location. Advised patient she may eat a light breakfast/dinner prior to going to L&D. In the interim please report any vaginal bleeding,leakage of fluid,decreased fetal movement or contractions.Reviewed fetal kick counts. Advised to keep all upcoming prenatal visits.

## 2024-03-02 ENCOUNTER — ANESTHESIA EVENT (INPATIENT)
Dept: ANESTHESIOLOGY | Facility: HOSPITAL | Age: 34
End: 2024-03-02
Payer: COMMERCIAL

## 2024-03-02 ENCOUNTER — ANESTHESIA (INPATIENT)
Dept: ANESTHESIOLOGY | Facility: HOSPITAL | Age: 34
End: 2024-03-02
Payer: COMMERCIAL

## 2024-03-02 ENCOUNTER — HOSPITAL ENCOUNTER (INPATIENT)
Facility: HOSPITAL | Age: 34
LOS: 1 days | Discharge: HOME | End: 2024-03-03
Attending: STUDENT IN AN ORGANIZED HEALTH CARE EDUCATION/TRAINING PROGRAM | Admitting: OBSTETRICS & GYNECOLOGY
Payer: COMMERCIAL

## 2024-03-02 DIAGNOSIS — Z3A.38 38 WEEKS GESTATION OF PREGNANCY: Primary | ICD-10-CM

## 2024-03-02 LAB
ABO GROUP BLD: NORMAL
ABO GROUP BLD: NORMAL
BASE EXCESS BLDCOA CALC-SCNC: -4.5 MMOL/L (ref 3–11)
BASE EXCESS BLDCOV CALC-SCNC: -7.7 MMOL/L (ref 1–9)
BLD GP AB SCN SERPL QL: NEGATIVE
ERYTHROCYTE [DISTWIDTH] IN BLOOD BY AUTOMATED COUNT: 12.1 % (ref 11.6–15.1)
HCO3 BLDCOA-SCNC: 23.3 MMOL/L (ref 17.3–27.3)
HCO3 BLDCOV-SCNC: 18.5 MMOL/L (ref 12.2–28.6)
HCT VFR BLD AUTO: 37.1 % (ref 34.8–46.1)
HGB BLD-MCNC: 12.6 G/DL (ref 11.5–15.4)
HOLD SPECIMEN: NORMAL
MCH RBC QN AUTO: 30.5 PG (ref 26.8–34.3)
MCHC RBC AUTO-ENTMCNC: 34 G/DL (ref 31.4–37.4)
MCV RBC AUTO: 90 FL (ref 82–98)
O2 CT VFR BLDCOA CALC: 6 ML/DL
OXYHGB MFR BLDCOA: 27.9 %
OXYHGB MFR BLDCOV: 54.9 %
PCO2 BLDCOA: 53.6 MM[HG] (ref 30–60)
PCO2 BLDCOV: 39.9 MM HG (ref 27–43)
PH BLDCOA: 7.26 [PH] (ref 7.23–7.43)
PH BLDCOV: 7.28 [PH] (ref 7.19–7.49)
PLATELET # BLD AUTO: 156 THOUSANDS/UL (ref 149–390)
PMV BLD AUTO: 11.6 FL (ref 8.9–12.7)
PO2 BLDCOA: 14.4 MM HG (ref 5–25)
PO2 BLDCOV: 20.8 MM HG (ref 15–45)
RBC # BLD AUTO: 4.13 MILLION/UL (ref 3.81–5.12)
RH BLD: POSITIVE
RH BLD: POSITIVE
RUBV IGG SERPL IA-ACNC: 41.5 IU/ML
SAO2 % BLDCOV: 11.2 ML/DL
SPECIMEN EXPIRATION DATE: NORMAL
WBC # BLD AUTO: 12.02 THOUSAND/UL (ref 4.31–10.16)

## 2024-03-02 PROCEDURE — 86850 RBC ANTIBODY SCREEN: CPT

## 2024-03-02 PROCEDURE — 87340 HEPATITIS B SURFACE AG IA: CPT

## 2024-03-02 PROCEDURE — NC001 PR NO CHARGE: Performed by: OBSTETRICS & GYNECOLOGY

## 2024-03-02 PROCEDURE — 99213 OFFICE O/P EST LOW 20 MIN: CPT

## 2024-03-02 PROCEDURE — 85027 COMPLETE CBC AUTOMATED: CPT

## 2024-03-02 PROCEDURE — 86706 HEP B SURFACE ANTIBODY: CPT

## 2024-03-02 PROCEDURE — 59409 OBSTETRICAL CARE: CPT | Performed by: OBSTETRICS & GYNECOLOGY

## 2024-03-02 PROCEDURE — 82805 BLOOD GASES W/O2 SATURATION: CPT | Performed by: STUDENT IN AN ORGANIZED HEALTH CARE EDUCATION/TRAINING PROGRAM

## 2024-03-02 PROCEDURE — 86762 RUBELLA ANTIBODY: CPT

## 2024-03-02 PROCEDURE — 86900 BLOOD TYPING SEROLOGIC ABO: CPT

## 2024-03-02 PROCEDURE — 87389 HIV-1 AG W/HIV-1&-2 AB AG IA: CPT

## 2024-03-02 PROCEDURE — 86780 TREPONEMA PALLIDUM: CPT

## 2024-03-02 PROCEDURE — 86901 BLOOD TYPING SEROLOGIC RH(D): CPT

## 2024-03-02 PROCEDURE — 4A1HXCZ MONITORING OF PRODUCTS OF CONCEPTION, CARDIAC RATE, EXTERNAL APPROACH: ICD-10-PCS | Performed by: OBSTETRICS & GYNECOLOGY

## 2024-03-02 PROCEDURE — 86803 HEPATITIS C AB TEST: CPT

## 2024-03-02 RX ORDER — MAGNESIUM HYDROXIDE/ALUMINUM HYDROXICE/SIMETHICONE 120; 1200; 1200 MG/30ML; MG/30ML; MG/30ML
15 SUSPENSION ORAL EVERY 6 HOURS PRN
Status: DISCONTINUED | OUTPATIENT
Start: 2024-03-02 | End: 2024-03-03 | Stop reason: HOSPADM

## 2024-03-02 RX ORDER — OXYTOCIN/RINGER'S LACTATE 30/500 ML
PLASTIC BAG, INJECTION (ML) INTRAVENOUS
Status: COMPLETED
Start: 2024-03-02 | End: 2024-03-02

## 2024-03-02 RX ORDER — SENNOSIDES 8.6 MG
1 TABLET ORAL DAILY
Status: DISCONTINUED | OUTPATIENT
Start: 2024-03-02 | End: 2024-03-03 | Stop reason: HOSPADM

## 2024-03-02 RX ORDER — ONDANSETRON 2 MG/ML
4 INJECTION INTRAMUSCULAR; INTRAVENOUS EVERY 4 HOURS PRN
Status: DISCONTINUED | OUTPATIENT
Start: 2024-03-02 | End: 2024-03-02

## 2024-03-02 RX ORDER — ONDANSETRON 2 MG/ML
4 INJECTION INTRAMUSCULAR; INTRAVENOUS EVERY 8 HOURS PRN
Status: DISCONTINUED | OUTPATIENT
Start: 2024-03-02 | End: 2024-03-03 | Stop reason: HOSPADM

## 2024-03-02 RX ORDER — ACETAMINOPHEN 325 MG/1
650 TABLET ORAL EVERY 6 HOURS SCHEDULED
Status: DISCONTINUED | OUTPATIENT
Start: 2024-03-02 | End: 2024-03-03 | Stop reason: HOSPADM

## 2024-03-02 RX ORDER — SODIUM CHLORIDE, SODIUM LACTATE, POTASSIUM CHLORIDE, CALCIUM CHLORIDE 600; 310; 30; 20 MG/100ML; MG/100ML; MG/100ML; MG/100ML
125 INJECTION, SOLUTION INTRAVENOUS CONTINUOUS
Status: DISCONTINUED | OUTPATIENT
Start: 2024-03-02 | End: 2024-03-03 | Stop reason: HOSPADM

## 2024-03-02 RX ORDER — LIDOCAINE HYDROCHLORIDE AND EPINEPHRINE 15; 5 MG/ML; UG/ML
INJECTION, SOLUTION EPIDURAL
Status: COMPLETED | OUTPATIENT
Start: 2024-03-02 | End: 2024-03-02

## 2024-03-02 RX ORDER — CALCIUM CARBONATE 500 MG/1
1000 TABLET, CHEWABLE ORAL 3 TIMES DAILY PRN
Status: DISCONTINUED | OUTPATIENT
Start: 2024-03-02 | End: 2024-03-03 | Stop reason: HOSPADM

## 2024-03-02 RX ORDER — PHENYLEPHRINE HCL IN 0.9% NACL 1 MG/10 ML
100 SYRINGE (ML) INTRAVENOUS ONCE AS NEEDED
Status: DISCONTINUED | OUTPATIENT
Start: 2024-03-02 | End: 2024-03-02

## 2024-03-02 RX ORDER — DIPHENHYDRAMINE HCL 25 MG
25 TABLET ORAL EVERY 6 HOURS PRN
Status: DISCONTINUED | OUTPATIENT
Start: 2024-03-02 | End: 2024-03-03 | Stop reason: HOSPADM

## 2024-03-02 RX ORDER — BUPIVACAINE HYDROCHLORIDE 2.5 MG/ML
30 INJECTION, SOLUTION EPIDURAL; INFILTRATION; INTRACAUDAL ONCE AS NEEDED
Status: DISCONTINUED | OUTPATIENT
Start: 2024-03-02 | End: 2024-03-02

## 2024-03-02 RX ORDER — OXYTOCIN/RINGER'S LACTATE 30/500 ML
250 PLASTIC BAG, INJECTION (ML) INTRAVENOUS CONTINUOUS
Status: ACTIVE | OUTPATIENT
Start: 2024-03-02 | End: 2024-03-02

## 2024-03-02 RX ORDER — DOCUSATE SODIUM 100 MG/1
100 CAPSULE, LIQUID FILLED ORAL 2 TIMES DAILY
Status: DISCONTINUED | OUTPATIENT
Start: 2024-03-02 | End: 2024-03-03 | Stop reason: HOSPADM

## 2024-03-02 RX ORDER — BENZOCAINE/MENTHOL 6 MG-10 MG
1 LOZENGE MUCOUS MEMBRANE DAILY PRN
Status: DISCONTINUED | OUTPATIENT
Start: 2024-03-02 | End: 2024-03-03 | Stop reason: HOSPADM

## 2024-03-02 RX ORDER — SIMETHICONE 80 MG
80 TABLET,CHEWABLE ORAL 4 TIMES DAILY PRN
Status: DISCONTINUED | OUTPATIENT
Start: 2024-03-02 | End: 2024-03-03 | Stop reason: HOSPADM

## 2024-03-02 RX ORDER — ROPIVACAINE HYDROCHLORIDE 2 MG/ML
INJECTION, SOLUTION EPIDURAL; INFILTRATION; PERINEURAL AS NEEDED
Status: DISCONTINUED | OUTPATIENT
Start: 2024-03-02 | End: 2024-03-02 | Stop reason: HOSPADM

## 2024-03-02 RX ORDER — VALACYCLOVIR HYDROCHLORIDE 500 MG/1
500 TABLET, FILM COATED ORAL 2 TIMES DAILY
Status: DISCONTINUED | OUTPATIENT
Start: 2024-03-02 | End: 2024-03-03 | Stop reason: HOSPADM

## 2024-03-02 RX ORDER — IBUPROFEN 600 MG/1
600 TABLET ORAL EVERY 6 HOURS SCHEDULED
Status: DISCONTINUED | OUTPATIENT
Start: 2024-03-02 | End: 2024-03-03 | Stop reason: HOSPADM

## 2024-03-02 RX ADMIN — ACETAMINOPHEN 650 MG: 325 TABLET ORAL at 21:01

## 2024-03-02 RX ADMIN — DOCUSATE SODIUM 100 MG: 100 CAPSULE, LIQUID FILLED ORAL at 13:05

## 2024-03-02 RX ADMIN — Medication 250 MILLI-UNITS/MIN: at 09:33

## 2024-03-02 RX ADMIN — BENZOCAINE AND LEVOMENTHOL 1 APPLICATION: 200; 5 SPRAY TOPICAL at 13:05

## 2024-03-02 RX ADMIN — VALACYCLOVIR 500 MG: 500 TABLET, FILM COATED ORAL at 18:21

## 2024-03-02 RX ADMIN — IBUPROFEN 600 MG: 600 TABLET, FILM COATED ORAL at 21:00

## 2024-03-02 RX ADMIN — ROPIVACAINE HYDROCHLORIDE 6 ML: 2 INJECTION EPIDURAL; INFILTRATION; PERINEURAL at 09:17

## 2024-03-02 RX ADMIN — LIDOCAINE HYDROCHLORIDE AND EPINEPHRINE 5 ML: 15; 5 INJECTION, SOLUTION EPIDURAL at 09:14

## 2024-03-02 RX ADMIN — SENNOSIDES 8.6 MG: 8.6 TABLET, FILM COATED ORAL at 13:05

## 2024-03-02 RX ADMIN — WITCH HAZEL 1 PAD: 500 SOLUTION RECTAL; TOPICAL at 13:05

## 2024-03-02 RX ADMIN — SODIUM CHLORIDE, SODIUM LACTATE, POTASSIUM CHLORIDE, AND CALCIUM CHLORIDE 999 ML/HR: .6; .31; .03; .02 INJECTION, SOLUTION INTRAVENOUS at 08:50

## 2024-03-02 RX ADMIN — ONDANSETRON 4 MG: 2 INJECTION INTRAMUSCULAR; INTRAVENOUS at 08:55

## 2024-03-02 NOTE — H&P
H & P- Obstetrics   Jeannie Carr 33 y.o. female MRN: [id number]  Unit/Bed#:  201-01 Encounter: [id number]    Assessment: 33 y.o.  at 38w3d admitted for labor.  SVE: /  FHT: cat2  Clinical EFW: 57%; Cephalic confirmed by digital exam  GBS status: negative     Plan:   Uterine contractions during pregnancy  Assessment & Plan  Painful uterine contractions  SVE   Admitted for labor  Admission labs - CBC, T&S, syphilis screen  Clear liquid diet  Anesthesia consult placed   Rh+, Rhogam not indicated   GBS-, prophylaxis not indicated  Plan for expectant management      Genital herpes affecting pregnancy in third trimester  Assessment & Plan  On valtrex  Patient unable to tolerate speculum exam prior to epidural  Immediately after epidural placement patient noted to be complete and +3, started pushing secondary to category 2 fetal heart tracing   No lesions noted on external exam    38 weeks gestation of pregnancy  Assessment & Plan  Up to date on prenatal care  Blood type collected on admission   GBS negative   EFW 57%  History of HSV on valtrex      Discussed case and plan w/ Dr. Finley.      Chief Complaint: contractions    HPI: Jeannie Carr is a 33 y.o.  with an SAMANTHA of 3/13/2024, by Last Menstrual Period at 38w3d who is being admitted for labor . She complains of uterine contractions, occurring every 2 minutes, has no LOF, and reports no VB. She states she has felt good FM.    Patient Active Problem List   Diagnosis    GERD (gastroesophageal reflux disease)    IBS (irritable bowel syndrome)    38 weeks gestation of pregnancy    History of prior pregnancy with small for gestational age     Genital herpes affecting pregnancy in third trimester    Uterine contractions during pregnancy    Nausea & vomiting       Baby complications/comments: none    Review of Systems   Constitutional:  Negative for chills and fever.   Respiratory:  Negative for cough and shortness of breath.     Gastrointestinal:  Negative for diarrhea, nausea and vomiting.   Genitourinary:  Negative for dysuria and hematuria.   Skin:  Negative for color change and rash.   Neurological:  Negative for dizziness, syncope and headaches.       OB Hx:  OB History    Para Term  AB Living   2 1   1   1   SAB IAB Ectopic Multiple Live Births           1      # Outcome Date GA Lbr Melvin/2nd Weight Sex Delivery Anes PTL Lv   2 Current            1  21    M Vag-Spont EPI  CHARISMA       Past Medical Hx:  Past Medical History:   Diagnosis Date    Abnormal Pap smear of cervix 2014 Lsil   wnl    Herpes     hx of- one outbreak - none since    Perineal pain in female 2018    Varicella     had vaccines       Past Surgical hx:  Past Surgical History:   Procedure Laterality Date    TOOTH EXTRACTION         Social Hx:  Alcohol use: denies  Tobacco use: denies  Other substance use: denies    No Known Allergies    Medications Prior to Admission   Medication    FLUoxetine (PROzac) 10 mg capsule    FLUoxetine (PROzac) 20 mg capsule    Lactobacillus (PROBIOTIC ACIDOPHILUS PO)    mometasone (ELOCON) 0.1 % cream    omeprazole (PriLOSEC) 20 mg delayed release capsule    Prenatal Vit-Fe Fumarate-FA (PRENATAL VITAMINS PLUS PO)    valACYclovir (VALTREX) 500 mg tablet       Objective:  HR:  [90-92] 92  BP: (109-137)/(66-91) 109/72  There is no height or weight on file to calculate BMI.     Physical Exam:  Physical Exam  Constitutional:       Appearance: Normal appearance.      Comments: Extremely uncomfortable through contractions   Genitourinary:      Vulva normal.   Cardiovascular:      Rate and Rhythm: Normal rate.   Pulmonary:      Effort: Pulmonary effort is normal.   Abdominal:      Palpations: Abdomen is soft.   Neurological:      General: No focal deficit present.      Mental Status: She is alert and oriented to person, place, and time.   Skin:     General: Skin is dry.   Psychiatric:         Mood and  Affect: Mood normal.        FHT:  Baseline Rate (FHR): 120 bpm  Variability: Moderate  Accelerations: 15 x 15 or greater  Decelerations: (!) Early, Late  FHR Category: Category II    TOCO:   Contraction Frequency (minutes): 1-3    Lab Results   Component Value Date    WBC 12.02 (H) 03/02/2024    HGB 12.6 03/02/2024    HCT 37.1 03/02/2024     03/02/2024     Lab Results   Component Value Date    K 4.1 02/17/2024     02/17/2024    CO2 19 (L) 02/17/2024    BUN 11 02/17/2024    CREATININE 0.68 02/17/2024    AST 11 (L) 02/17/2024    ALT 7 02/17/2024     Prenatal Labs: Reviewed      Blood type: collected on admission   Antibody: collected on admission  GBS: negative  HIV: collected on admission  Rubella: collected on admission  Syphilis IgM/IgG: non-reactive  HBsAg: collected on admission  HCAb: collected on admission  Chlamydia: collected on admission  Gonorrhea: collected on admission  Diabetes 1 hour screen: passed  3 hour glucose: n/a  Platelets: 156  Hgb: 12.6  >2 Midnights  INPATIENT     Signature/Title: Fabi Prince MD  Date: 3/2/2024  Time: 9:55 AM

## 2024-03-02 NOTE — ANESTHESIA POSTPROCEDURE EVALUATION
Post-Op Assessment Note    CV Status:  Stable    Pain management: adequate      Post-op block assessment: no complications, site cleaned and catheter intact   Mental Status:  Alert and awake   Hydration Status:  Euvolemic   PONV Controlled:  Controlled   Airway Patency:  Patent     Post Op Vitals Reviewed: Yes    No anethesia notable event occurred.    Staff: VINAY           BP   115/63   Temp      Pulse  73   Resp      SpO2

## 2024-03-02 NOTE — PLAN OF CARE
Problem: PAIN - ADULT  Goal: Verbalizes/displays adequate comfort level or baseline comfort level  Description: Interventions:  - Encourage patient to monitor pain and request assistance  - Assess pain using appropriate pain scale  - Administer analgesics based on type and severity of pain and evaluate response  - Implement non-pharmacological measures as appropriate and evaluate response  - Consider cultural and social influences on pain and pain management  - Notify physician/advanced practitioner if interventions unsuccessful or patient reports new pain  Outcome: Progressing     Problem: INFECTION - ADULT  Goal: Absence or prevention of progression during hospitalization  Description: INTERVENTIONS:  - Assess and monitor for signs and symptoms of infection  - Monitor lab/diagnostic results  - Monitor all insertion sites, i.e. indwelling lines, tubes, and drains  - Monitor endotracheal if appropriate and nasal secretions for changes in amount and color  - [place] appropriate cooling/warming therapies per order  - Administer medications as ordered  - Instruct and encourage patient and family to use good hand hygiene technique  - Identify and instruct in appropriate isolation precautions for identified infection/condition  Outcome: Progressing  Goal: Absence of fever/infection during neutropenic period  Description: INTERVENTIONS:  - Monitor WBC    Outcome: Progressing     Problem: SAFETY ADULT  Goal: Patient will remain free of falls  Description: INTERVENTIONS:  - Educate patient/family on patient safety including physical limitations  - Instruct patient to call for assistance with activity   - Consult OT/PT to assist with strengthening/mobility   - Keep Call bell within reach  - Keep bed low and locked with side rails adjusted as appropriate  - Keep care items and personal belongings within reach  - Initiate and maintain comfort rounds  - Make Fall Risk Sign visible to staff  - Offer Toileting every  Hours,  in advance of need  - Initiate/Maintain alarm  - Obtain necessary fall risk management equipment:   - Apply yellow socks and bracelet for high fall risk patients  - Consider moving patient to room near nurses station  Outcome: Progressing  Goal: Maintain or return to baseline ADL function  Description: INTERVENTIONS:  -  Assess patient's ability to carry out ADLs; assess patient's baseline for ADL function and identify physical deficits which impact ability to perform ADLs (bathing, care of mouth/teeth, toileting, grooming, dressing, etc.)  - Assess/evaluate cause of self-care deficits   - Assess range of motion  - Assess patient's mobility; develop plan if impaired  - Assess patient's need for assistive devices and provide as appropriate  - Encourage maximum independence but intervene and supervise when necessary  - Involve family in performance of ADLs  - Assess for home care needs following discharge   - Consider OT consult to assist with ADL evaluation and planning for discharge  - Provide patient education as appropriate  Outcome: Progressing  Goal: Maintains/Returns to pre admission functional level  Description: INTERVENTIONS:  - Perform AM-PAC 6 Click Basic Mobility/ Daily Activity assessment daily.  - Set and communicate daily mobility goal to care team and patient/family/caregiver.   - Collaborate with rehabilitation services on mobility goals if consulted  - Perform Range of Motion  times a day.  - Reposition patient every  hours.  - Dangle patient times a day  - Stand patient  times a day  - Ambulate patient  times a day  - Out of bed to chair  times a day   - Out of bed for meals  times a day  - Out of bed for toileting  - Record patient progress and toleration of activity level   Outcome: Progressing     Problem: Knowledge Deficit  Goal: Patient/family/caregiver demonstrates understanding of disease process, treatment plan, medications, and discharge instructions  Description: Complete learning  assessment and assess knowledge base.  Interventions:  - Provide teaching at level of understanding  - Provide teaching via preferred learning methods  Outcome: Completed  Goal: Verbalizes understanding of labor plan  Description: Assess patient/family/caregiver's baseline knowledge level and ability to understand information.  Provide education via patient/family/caregiver's preferred learning method at appropriate level of understanding.     1. Provide teaching at level of understanding.  2. Provide teaching via preferred learning method(s).  Outcome: Completed     Problem: DISCHARGE PLANNING  Goal: Discharge to home or other facility with appropriate resources  Description: INTERVENTIONS:  - Identify barriers to discharge w/patient and caregiver  - Arrange for needed discharge resources and transportation as appropriate  - Identify discharge learning needs (meds, wound care, etc.)  - Arrange for interpretive services to assist at discharge as needed  - Refer to Case Management Department for coordinating discharge planning if the patient needs post-hospital services based on physician/advanced practitioner order or complex needs related to functional status, cognitive ability, or social support system  Outcome: Progressing     Problem: POSTPARTUM  Goal: Experiences normal postpartum course  Description: INTERVENTIONS:  - Monitor maternal vital signs  - Assess uterine involution and lochia  Outcome: Progressing  Goal: Appropriate maternal -  bonding  Description: INTERVENTIONS:  - Identify family support  - Assess for appropriate maternal/infant bonding   -Encourage maternal/infant bonding opportunities  - Referral to  or  as needed  Outcome: Progressing  Goal: Establishment of infant feeding pattern  Description: INTERVENTIONS:  - Assess breast/bottle feeding  - Refer to lactation as needed  Outcome: Progressing  Goal: Incision(s), wounds(s) or drain site(s) healing without S/S of  infection  Description: INTERVENTIONS  - Assess and document dressing, incision, wound bed, drain sites and surrounding tissue  - Provide patient and family education  - Perform skin care/dressing changes every  Outcome: Progressing     Problem: ALTERATION IN THE BREAST  Goal: Optimize infant feeding at the breast  Description: INTERVENTIONS:  - Latch, breast and nipple assessment  - Assess prior breast feeding history  - Hand expression of breast milk  - For cracked, bleeding and or sore nipples reassess latch, treat damaged nipple  -Educate mother on feeding cues  -Positioning/latch techniques  Outcome: Progressing     Problem: INADEQUATE LATCH, SUCK OR SWALLOW  Goal: Demonstrate ability to latch and sustain latch, audible swallowing and satiety  Description: INTERVENTIONS:  - Assess oral anatomy, notify Physician/AP for abnormal findings  - Establish milk expression  - Maximize feeding opportunity (skin to skin, behavioral state)  - Position/latch techniques  - Discourage use of pacifier-artificial nipple  - Mechanical pumping  - Nipple Shield  - Supplemental formula feeding (Physician/AP order)  - Alternative feeding method  Outcome: Progressing

## 2024-03-02 NOTE — DISCHARGE SUMMARY
Discharge Summary - OB/GYN   Jeannie Carr 33 y.o. female MRN: [id number]  Unit/Bed#: -01 Encounter: [id number]      Admission Date: 3/2/2024     Discharge Date: 24     Admitting Diagnosis:   Patient Active Problem List   Diagnosis    GERD (gastroesophageal reflux disease)    IBS (irritable bowel syndrome)     (spontaneous vaginal delivery)    History of prior pregnancy with small for gestational age     Genital herpes affecting pregnancy in third trimester    Uterine contractions during pregnancy    Nausea & vomiting        Discharge Diagnosis:   Same, delivered    Procedures: spontaneous vaginal delivery    Delivery Attending: Quita Finley MD  Discharge Attending: Triny Wyatt MD    Hospital Course:   Ms. Jeannie Carr is a 33 y.o.  at 38wk3d. She presented to labor and delivery for leakage of fluid and painful uterine contractions. Her pregnancy was complicated by history of HSV, on valtrex. On exam in triage she was noted to be 5/90/0 and grossly ruptured. Her FHT was category 2 for occasional late vs early decelerations. She was admitted for labor. She was unable to tolerate a speculum exam prior to epidural placement, and once epidural was placed she was noted to be complete with station of +3. No lesions were noted on external exam.  She received an epidural. She progressed to complete and began pushing.     She delivered a viable male  on 24 at 0931. Weight 6lbs 11.1oz via spontaneous vaginal delivery. Apgars were 6 (1 min) and 8 (5 min).  was transferred to  nursery. Patient tolerated the procedure well and was transferred to recovery in stable condition.     Her postpartum course was uncomplicated. Her postpartum pain was well controlled with oral analgesics.    On day of discharge, she was ambulating and able to reasonably perform all ADLs. She was voiding and had appropriate bowel function. Pain was well controlled. She was  discharged home on post-partum day #1 without complications. Patient was instructed to follow up with her OB as an outpatient and was given appropriate warnings to call provider if she develops signs of infection or uncontrolled pain.    Complications: none apparent    Condition at discharge: good     Discharge instructions/Information to patient and family:   - Do not place anything (no partner, tampons or douche) in your vagina for 6 weeks.  -You may walk for exercise for the first 6 weeks then gradually return to your usual activities.   -Please do not drive for 1 week if you have no stitches and for 2 weeks if you have stitches or underwent a  delivery.    -You may take baths or shower per your preference.   -Please look at your bust (breasts) in the mirror daily and call for redness or tenderness or increased warmth.   -Please call us for temperature > 100.4*F or 38* C, worsening pain or a foul discharge.      Discharge Medications:   Prenatal vitamin daily for 6 months or the duration of nursing whichever is longer.  Motrin 600 mg orally every 6 hours as needed for pain  Tylenol (over the counter) per bottle directions as needed for pain: do NOT use with percocet  Hydrocortisone cream 1% (over the counter) applied 1-2x daily to hemorrhoids as needed    Planned Readmission: No      Fabi Prince MD  PGY-1 OBGYN

## 2024-03-02 NOTE — PLAN OF CARE
Problem: PAIN - ADULT  Goal: Verbalizes/displays adequate comfort level or baseline comfort level  Description: Interventions:  - Encourage patient to monitor pain and request assistance  - Assess pain using appropriate pain scale  - Administer analgesics based on type and severity of pain and evaluate response  - Implement non-pharmacological measures as appropriate and evaluate response  - Consider cultural and social influences on pain and pain management  - Notify physician/advanced practitioner if interventions unsuccessful or patient reports new pain  Outcome: Progressing     Problem: INFECTION - ADULT  Goal: Absence or prevention of progression during hospitalization  Description: INTERVENTIONS:  - Assess and monitor for signs and symptoms of infection  - Monitor lab/diagnostic results  - Monitor all insertion sites, i.e. indwelling lines, tubes, and drains  - Monitor endotracheal if appropriate and nasal secretions for changes in amount and color  - [place] appropriate cooling/warming therapies per order  - Administer medications as ordered  - Instruct and encourage patient and family to use good hand hygiene technique  - Identify and instruct in appropriate isolation precautions for identified infection/condition  Outcome: Progressing  Goal: Absence of fever/infection during neutropenic period  Description: INTERVENTIONS:  - Monitor WBC    Outcome: Progressing     Problem: SAFETY ADULT  Goal: Patient will remain free of falls  Description: INTERVENTIONS:  - Educate patient/family on patient safety including physical limitations  - Instruct patient to call for assistance with activity   - Consult OT/PT to assist with strengthening/mobility   - Keep Call bell within reach  - Keep bed low and locked with side rails adjusted as appropriate  - Keep care items and personal belongings within reach  - Initiate and maintain comfort rounds  - Make Fall Risk Sign visible to staff  - Consider moving patient to room  near nurses station  Outcome: Progressing  Goal: Maintain or return to baseline ADL function  Description: INTERVENTIONS:  -  Assess patient's ability to carry out ADLs; assess patient's baseline for ADL function and identify physical deficits which impact ability to perform ADLs (bathing, care of mouth/teeth, toileting, grooming, dressing, etc.)  - Assess/evaluate cause of self-care deficits   - Assess range of motion  - Assess patient's mobility; develop plan if impaired  - Assess patient's need for assistive devices and provide as appropriate  - Encourage maximum independence but intervene and supervise when necessary  - Involve family in performance of ADLs  - Assess for home care needs following discharge   - Consider OT consult to assist with ADL evaluation and planning for discharge  - Provide patient education as appropriate  Outcome: Progressing  Goal: Maintains/Returns to pre admission functional level  Description: INTERVENTIONS:  - Perform AM-PAC 6 Click Basic Mobility/ Daily Activity assessment daily.  - Set and communicate daily mobility goal to care team and patient/family/caregiver.   - Record patient progress and toleration of activity level   Outcome: Progressing     Problem: Knowledge Deficit  Goal: Patient/family/caregiver demonstrates understanding of disease process, treatment plan, medications, and discharge instructions  Description: Complete learning assessment and assess knowledge base.  Interventions:  - Provide teaching at level of understanding  - Provide teaching via preferred learning methods  Outcome: Progressing  Goal: Verbalizes understanding of labor plan  Description: Assess patient/family/caregiver's baseline knowledge level and ability to understand information.  Provide education via patient/family/caregiver's preferred learning method at appropriate level of understanding.     1. Provide teaching at level of understanding.  2. Provide teaching via preferred learning  method(s).  Outcome: Progressing     Problem: DISCHARGE PLANNING  Goal: Discharge to home or other facility with appropriate resources  Description: INTERVENTIONS:  - Identify barriers to discharge w/patient and caregiver  - Arrange for needed discharge resources and transportation as appropriate  - Identify discharge learning needs (meds, wound care, etc.)  - Arrange for interpretive services to assist at discharge as needed  - Refer to Case Management Department for coordinating discharge planning if the patient needs post-hospital services based on physician/advanced practitioner order or complex needs related to functional status, cognitive ability, or social support system  Outcome: Progressing     Problem: POSTPARTUM  Goal: Experiences normal postpartum course  Description: INTERVENTIONS:  - Monitor maternal vital signs  - Assess uterine involution and lochia  Outcome: Progressing  Goal: Appropriate maternal -  bonding  Description: INTERVENTIONS:  - Identify family support  - Assess for appropriate maternal/infant bonding   -Encourage maternal/infant bonding opportunities  - Referral to  or  as needed  Outcome: Progressing  Goal: Establishment of infant feeding pattern  Description: INTERVENTIONS:  - Assess breast/bottle feeding  - Refer to lactation as needed  Outcome: Progressing  Goal: Incision(s), wounds(s) or drain site(s) healing without S/S of infection  Description: INTERVENTIONS  - Assess and document dressing, incision, wound bed, drain sites and surrounding tissue  Outcome: Progressing

## 2024-03-02 NOTE — ANESTHESIA PREPROCEDURE EVALUATION
"Procedure:  LABOR ANALGESIA    Relevant Problems   GI/HEPATIC   (+) GERD (gastroesophageal reflux disease)      GYN   (+) 38 weeks gestation of pregnancy      CAD/PCI/MI/CHF -- denies  COPD/ASTHMA/FRAN -- denies  PROBS WITH PRIOR ANESTHESIA -- denies  NPO STATUS CONFIRMED    Lab Results   Component Value Date    SODIUM 134 (L) 02/17/2024    K 4.1 02/17/2024    BUN 11 02/17/2024    CREATININE 0.68 02/17/2024    EGFR 116 02/17/2024     No results found for: \"HGBA1C\"    Lab Results   Component Value Date    HGB 12.6 03/02/2024    HGB 12.5 02/17/2024    HGB 12.7 01/10/2024     03/02/2024     02/17/2024     01/10/2024     Lab Results   Component Value Date    WBC 12.02 (H) 03/02/2024       Lab Results   Component Value Date    CREATININE 0.68 02/17/2024    CREATININE 0.61 12/18/2023    CREATININE 0.87 03/15/2019       No results found for: \"INR\"  No results found for: \"PTT\"    No results found for: \"LACTATE\"                  Physical Exam    Airway    Mallampati score: II  TM Distance: >3 FB       Dental       Cardiovascular      Pulmonary      Other Findings  post-pubertal.      Anesthesia Plan  ASA Score- 2     Anesthesia Type- epidural with ASA Monitors.         Additional Monitors:     Airway Plan:     Comment: I, Rohit Fox M.D., have personally seen and evaluated the patient prior to anesthetic care.  I have reviewed the pre-anesthetic record, and other medical records if appropriate to the anesthetic care.  If a CRNA is involved in the case, I have reviewed the CRNA assessment, if present, and agree.      Risks/benefits and alternatives discussed with patient including possible PONV, sore throat, and possibility of rare anesthetic and surgical emergencies.  .       Plan Factors-    Chart reviewed. EKG reviewed. Imaging results reviewed. Existing labs reviewed. Patient summary reviewed.      Patient instructed to abstain from smoking on day of procedure. Patient did not smoke on day of " surgery.    Obstructive sleep apnea risk education given perioperatively.There is medical exclusion for perioperative obstructive sleep apnea risk education.        Induction-     Postoperative Plan-     Informed Consent- Anesthetic plan and risks discussed with patient.  I personally reviewed this patient with the CRNA. Discussed and agreed on the Anesthesia Plan with the CRNA..

## 2024-03-02 NOTE — ANESTHESIA PROCEDURE NOTES
Epidural Block    Start time: 3/2/2024 9:15 AM  Reason for block: procedure for pain  Staffing  Performed by: Rohit Fox MD  Authorized by: Rohit Fox MD    Preanesthetic Checklist  Completed: patient identified, IV checked, site marked, risks and benefits discussed, surgical consent, monitors and equipment checked, pre-op evaluation and timeout performed  Epidural  Patient position: sitting  Prep: ChloraPrep  Sedation Level: no sedation  Patient monitoring: frequent blood pressure checks, continuous pulse oximetry and heart rate  Approach: midline  Location: lumbar, L3-4  Injection technique: CRISTINO saline  Needle  Needle type: Tuohy   Needle gauge: 17 G  Catheter type: multi-orifice  Catheter size: 20 G  Catheter securement method: stabilization device and clear occlusive dressing  Test dose: negativelidocaine-epinephrine (XYLOCAINE-MPF/EPINEPHRINE) 1.5 %-1:200,000 injection 3 mL - Epidural   5 mL - 3/2/2024 9:14:00 AM  Assessment  Sensory level: T10  Number of attempts: 1negative aspiration for CSF, negative aspiration for heme and no paresthesia on injection  patient tolerated the procedure well with no immediate complications  Additional Notes  Single skin puncture and needle pass.  CRISTINO air @ 5 cm.  Catheter threaded to 20cm no paresthesias.  Final position 10 at skin.  Aspirated neg for heme/csf; td negative.

## 2024-03-02 NOTE — ASSESSMENT & PLAN NOTE
Painful uterine contractions  SVE 5/90/0  Admitted for labor  Admission labs - CBC, T&S, syphilis screen  Clear liquid diet  Anesthesia consult placed   Rh+, Rhogam not indicated   GBS-, prophylaxis not indicated  Plan for expectant management

## 2024-03-02 NOTE — ASSESSMENT & PLAN NOTE
On valtrex  Patient unable to tolerate speculum exam prior to epidural  Immediately after epidural placement patient noted to be complete and +3, started pushing secondary to category 2 fetal heart tracing   No lesions noted on external exam

## 2024-03-02 NOTE — L&D DELIVERY NOTE
Vaginal Delivery Summary - OB/GYN   Jeannie Carr 33 y.o. female MRN: [id number]  Unit/Bed#: -01 Encounter: [id number]      Pre-delivery Diagnosis:   1. Pregnancy at 38w3d   2. HSV    Post-delivery Diagnosis: same, delivered    Procedure: Spontaneous Vaginal Delivery     Attending: Dr. Finley    Assistant(s): Dr. Prince    Anesthesia: Epidural    QBL: pending    Complications: none apparent    Specimens:   1. Arterial and venous cord gases  2. Cord blood  3. Segment of umbilical cord  4. Placenta to storage     Findings:  1. Viable male on 3/2/2024 at 0931, with APGARS of 6 and 8 at 1 and 5 minutes respectively,  2. Spontaneous delivery of intact placenta at 0935  3. Periurethral laceration repaired with 4-0 vicryl  4. Umbilical Cord Venous Blood Gas:  Results from last 7 days   Lab Units 24  0936   PH COV  7.283   PCO2 COV mm HG 39.9   HCO3 COV mmol/L 18.5   BASE EXC COV mmol/L -7.7*   O2 CT CD VB mL/dL 11.2   O2 HGB, VENOUS CORD % 54.9     5. Umbilical Cord Arterial Blood Gas:  Results from last 7 days   Lab Units 24  0936   PH COA  7.256   PCO2 COA  53.6   PO2 COA mm HG 14.4   HCO3 COA mmol/L 23.3   BASE EXC COA mmol/L -4.5*   O2 CONTENT CORD ART ml/dl 6.0   O2 HGB, ARTERIAL CORD % 27.9       Disposition:  Patient tolerated the procedure well and was recovering in labor and delivery room.     Brief history and labor course:  Ms. Jeannie Carr is a 33 y.o.  at 38wk3d. She presented to labor and delivery for leakage of fluid and painful uterine contractions. Her pregnancy was complicated by history of HSV, on valtrex. On exam in triage she was noted to be 5/90/0 and grossly ruptured. Her FHT was category 2 for occasional late vs early decelerations. She was admitted for labor. She was unable to tolerate a speculum exam prior to epidural placement, and once epidural was placed she was noted to be complete with station of +3. No lesions were noted on external exam.      Description of  procedure:    After pushing for 6 minutes, at 0931 patient delivered a viable male , wt pending, apgars of 6 (1 min) and 8 (5 min). The fetal vertex delivered spontaneously. There was a loose nuchal cord that was reduced prior to delivery. Baby was OA, restituted to LAZARO. The right anterior shoulder delivered atraumatically with maternal expulsive forces and the assistance of gentle downward traction. The left posterior shoulder delivered with maternal expulsive forces and the assistance of gentle upward traction. The remainder of the fetus delivered spontaneously.     Upon delivery, the infant was placed on the mothers abdomen and the cord was clamped and cut. The infant was noted to cry spontaneously and was moving all extremities appropriately. There was no evidence for injury. Awaiting nurse resuscitators evaluated the . Arterial and venous cord blood gases and cord blood was collected for analysis. These were promptly sent to the lab. In the immediate post-partum, 30 units of IV pitocin was administered, and the uterus was noted to contract down well with massage and pitocin. The placenta delivered spontaneously at 0935 and was noted to have a centrally inserted 3 vessel cord.     The vagina, cervix, perineum, and rectum were inspected and there was noted to be a mildine periurethral laceration requiring repair. Patient was comfortable with epidural at this time, and a figure-of-eight stitch using 4-0 vicryl was placed for good reapproximation and hemostasis.     At the conclusion of the procedure, all needle, sponge, and instrument counts were noted to be correct. Patient tolerated the procedure well and was allowed to recover in labor and delivery room with family and  before being transferred to the post-partum floor. Dr. Finley was present and participated in all key portions of the case.    Fabi Prince MD  OB/GYN PGY-1  3/2/2024  10:30 AM

## 2024-03-03 VITALS
OXYGEN SATURATION: 100 % | HEIGHT: 64 IN | HEART RATE: 63 BPM | SYSTOLIC BLOOD PRESSURE: 119 MMHG | BODY MASS INDEX: 27.69 KG/M2 | RESPIRATION RATE: 18 BRPM | WEIGHT: 162.2 LBS | DIASTOLIC BLOOD PRESSURE: 75 MMHG | TEMPERATURE: 97.7 F

## 2024-03-03 LAB
HBV SURFACE AB SER-ACNC: 78.2 MIU/ML
HBV SURFACE AG SER QL: NORMAL
HCV AB SER QL: NORMAL
HIV 1+2 AB+HIV1 P24 AG SERPL QL IA: NORMAL
HIV 2 AB SERPL QL IA: NORMAL
HIV1 AB SERPL QL IA: NORMAL
HIV1 P24 AG SERPL QL IA: NORMAL
TREPONEMA PALLIDUM IGG+IGM AB [PRESENCE] IN SERUM OR PLASMA BY IMMUNOASSAY: NORMAL

## 2024-03-03 PROCEDURE — NC001 PR NO CHARGE: Performed by: OBSTETRICS & GYNECOLOGY

## 2024-03-03 PROCEDURE — 99024 POSTOP FOLLOW-UP VISIT: CPT | Performed by: OBSTETRICS & GYNECOLOGY

## 2024-03-03 RX ORDER — ACETAMINOPHEN 325 MG/1
650 TABLET ORAL EVERY 6 HOURS SCHEDULED
Start: 2024-03-03

## 2024-03-03 RX ORDER — IBUPROFEN 600 MG/1
600 TABLET ORAL EVERY 6 HOURS SCHEDULED
Start: 2024-03-03

## 2024-03-03 RX ADMIN — IBUPROFEN 600 MG: 600 TABLET, FILM COATED ORAL at 09:29

## 2024-03-03 RX ADMIN — VALACYCLOVIR 500 MG: 500 TABLET, FILM COATED ORAL at 09:29

## 2024-03-03 RX ADMIN — ACETAMINOPHEN 650 MG: 325 TABLET ORAL at 03:58

## 2024-03-03 RX ADMIN — ACETAMINOPHEN 650 MG: 325 TABLET ORAL at 09:29

## 2024-03-03 RX ADMIN — IBUPROFEN 600 MG: 600 TABLET, FILM COATED ORAL at 03:58

## 2024-03-03 NOTE — LACTATION NOTE
This note was copied from a baby's chart.  Lactation Discharge    Met with Jeannie who is breastfeeding her baby boy and both are set for discharge today.    Jeannie states breastfeeding is going well. She says baby cluster fed last night. Reviewed possibility of cluster feeding again tonight as it will be the first night home and baby may react to the change in environment.    Jeannie states baby shows visible early feeding cues and latches well. She has no pain in her breasts or nipples. She does not feel engorged yet, but the transition of her colostrum is notable upon examination. Its color and viscosity have changed.    Reviewed positioning of baby. Initially, Jeannie used a cradle hold, but was encouraged to use cross cradle for latching so that baby can get a deeper latch while she uses her other hand to compress the areola. After latching, Jeannie is then able to switch her hands to a cradle hold which appears more comfortable for her.    Described the key attributes of good positioning and latch. Baby is hugging the breast with hands on either side of the breast. Nose is lined up with the nipple and baby's chin is resting on the breast/areola below as an anchoring point for a wide latch. Baby's body is belly to belly/chest with mom's and his body is aligned with ear, shoulder, and hip in a straight line. Both of baby's cheeks should be pressed against the breast.    If the latch appears or feels shallow, mom can use her finger in baby's mouth to break the suction and take baby off the breast. There is no way to widen the latch or add more breast tissue if baby is already latched to shallowly. Mom should be encouraged to retry the latch until looks and feels wide and without pain.    Mom has the Discharge Breastfeeding Booklet for home reference and is encouraged to call the [facility] for any lactation assistance following discharge.

## 2024-03-03 NOTE — PLAN OF CARE
Problem: PAIN - ADULT  Goal: Verbalizes/displays adequate comfort level or baseline comfort level  Description: Interventions:  - Encourage patient to monitor pain and request assistance  - Assess pain using appropriate pain scale  - Administer analgesics based on type and severity of pain and evaluate response  - Implement non-pharmacological measures as appropriate and evaluate response  - Consider cultural and social influences on pain and pain management  - Notify physician/advanced practitioner if interventions unsuccessful or patient reports new pain  Outcome: Completed     Problem: INFECTION - ADULT  Goal: Absence or prevention of progression during hospitalization  Description: INTERVENTIONS:  - Assess and monitor for signs and symptoms of infection  - Monitor lab/diagnostic results  - Monitor all insertion sites, i.e. indwelling lines, tubes, and drains  - Monitor endotracheal if appropriate and nasal secretions for changes in amount and color  - [place] appropriate cooling/warming therapies per order  - Administer medications as ordered  - Instruct and encourage patient and family to use good hand hygiene technique  - Identify and instruct in appropriate isolation precautions for identified infection/condition  Outcome: Completed  Goal: Absence of fever/infection during neutropenic period  Description: INTERVENTIONS:  - Monitor WBC    Outcome: Completed     Problem: SAFETY ADULT  Goal: Patient will remain free of falls  Description: INTERVENTIONS:  - Educate patient/family on patient safety including physical limitations  - Instruct patient to call for assistance with activity   - Consult OT/PT to assist with strengthening/mobility   - Keep Call bell within reach  - Keep bed low and locked with side rails adjusted as appropriate  - Keep care items and personal belongings within reach  - Initiate and maintain comfort rounds  - Make Fall Risk Sign visible to staff  - Offer Toileting every  Hours, in  advance of need  - Initiate/Maintain alarm  - Obtain necessary fall risk management equipment:   - Apply yellow socks and bracelet for high fall risk patients  - Consider moving patient to room near nurses station  Outcome: Completed  Goal: Maintain or return to baseline ADL function  Description: INTERVENTIONS:  -  Assess patient's ability to carry out ADLs; assess patient's baseline for ADL function and identify physical deficits which impact ability to perform ADLs (bathing, care of mouth/teeth, toileting, grooming, dressing, etc.)  - Assess/evaluate cause of self-care deficits   - Assess range of motion  - Assess patient's mobility; develop plan if impaired  - Assess patient's need for assistive devices and provide as appropriate  - Encourage maximum independence but intervene and supervise when necessary  - Involve family in performance of ADLs  - Assess for home care needs following discharge   - Consider OT consult to assist with ADL evaluation and planning for discharge  - Provide patient education as appropriate  Outcome: Completed  Goal: Maintains/Returns to pre admission functional level  Description: INTERVENTIONS:  - Perform AM-PAC 6 Click Basic Mobility/ Daily Activity assessment daily.  - Set and communicate daily mobility goal to care team and patient/family/caregiver.   - Collaborate with rehabilitation services on mobility goals if consulted  - Perform Range of Motion  times a day.  - Reposition patient every  hours.  - Dangle patient  times a day  - Stand patient  times a day  - Ambulate patient  times a day  - Out of bed to chair  times a day   - Out of bed for meals  times a day  - Out of bed for toileting  - Record patient progress and toleration of activity level   Outcome: Completed     Problem: DISCHARGE PLANNING  Goal: Discharge to home or other facility with appropriate resources  Description: INTERVENTIONS:  - Identify barriers to discharge w/patient and caregiver  - Arrange for needed  discharge resources and transportation as appropriate  - Identify discharge learning needs (meds, wound care, etc.)  - Arrange for interpretive services to assist at discharge as needed  - Refer to Case Management Department for coordinating discharge planning if the patient needs post-hospital services based on physician/advanced practitioner order or complex needs related to functional status, cognitive ability, or social support system  Outcome: Completed     Problem: POSTPARTUM  Goal: Experiences normal postpartum course  Description: INTERVENTIONS:  - Monitor maternal vital signs  - Assess uterine involution and lochia  Outcome: Completed  Goal: Appropriate maternal -  bonding  Description: INTERVENTIONS:  - Identify family support  - Assess for appropriate maternal/infant bonding   -Encourage maternal/infant bonding opportunities  - Referral to  or  as needed  Outcome: Completed  Goal: Establishment of infant feeding pattern  Description: INTERVENTIONS:  - Assess breast/bottle feeding  - Refer to lactation as needed  Outcome: Completed  Goal: Incision(s), wounds(s) or drain site(s) healing without S/S of infection  Description: INTERVENTIONS  - Assess and document dressing, incision, wound bed, drain sites and surrounding tissue  - Provide patient and family education  - Perform skin care/dressing changes every   Outcome: Completed     Problem: ALTERATION IN THE BREAST  Goal: Optimize infant feeding at the breast  Description: INTERVENTIONS:  - Latch, breast and nipple assessment  - Assess prior breast feeding history  - Hand expression of breast milk  - For cracked, bleeding and or sore nipples reassess latch, treat damaged nipple  -Educate mother on feeding cues  -Positioning/latch techniques  Outcome: Completed     Problem: INADEQUATE LATCH, SUCK OR SWALLOW  Goal: Demonstrate ability to latch and sustain latch, audible swallowing and satiety  Description: INTERVENTIONS:  -  Assess oral anatomy, notify Physician/AP for abnormal findings  - Establish milk expression  - Maximize feeding opportunity (skin to skin, behavioral state)  - Position/latch techniques  - Discourage use of pacifier-artificial nipple  - Mechanical pumping  - Nipple Shield  - Supplemental formula feeding (Physician/AP order)  - Alternative feeding method  Outcome: Completed

## 2024-03-03 NOTE — LACTATION NOTE
This note was copied from a baby's chart.  CONSULT - LACTATION  Baby Boy (Ciaran Carr 0 days male MRN: [id number]    [facility] NURSERY Room / Bed: (N)/(N) Encounter: [id number]    Maternal Information     MOTHER:  Jeannie Carr  Maternal Age: 33 y.o.   OB History: # 1 - Date: 21, Sex: Male, Weight: None, GA: None, Delivery: Vaginal, Spontaneous, Apgar1: None, Apgar5: None, Living: Living, Birth Comments: None    # 2 - Date: 24, Sex: Male, Weight: 3035 g (6 lb 11.1 oz), GA: 38w3d, Delivery: Vaginal, Spontaneous, Apgar1: 6, Apgar5: 8, Living: Living, Birth Comments: None   Previouse breast reduction surgery? No    Lactation history:   Has patient previously breast fed: Yes   How long had patient previously breast fed: 4 months   Previous breast feeding complications: Other (Comment) (early supplementation)     Past Surgical History:   Procedure Laterality Date    TOOTH EXTRACTION          Birth information:  YOB: 2023   Time of birth: 9:31 AM   Sex: male   Delivery type: Vaginal, Spontaneous   Birth Weight: 3035 g (6 lb 11.1 oz)   Percent of Weight Change: 0%     Gestational Age: 38w3d   [unfilled]    Assessment     Breast and nipple assessment: normal assessment     Assessment: normal assessment    Feeding assessment: feeding well  LATCH:  Latch: Grasps breast, tongue down, lips flanged, rhythmic sucking   Audible Swallowing: Spontaneous and intermittent (24 hours old)   Type of Nipple: Everted (After stimulation)   Comfort (Breast/Nipple): Soft/non-tender   Hold (Positioning): Partial assist, teach one side, mother does other, staff holds   LATCH Score: 9          Feeding recommendations:   Breastfeed every 2-3 hours at minimum, or when baby cues, offer both breasts up to 30 minutes each, watch baby for satiation signs, use breastfeeding log to monitor feeds and baby diaper output      Met with Jeannie who is breastfeeding her  baby boy. Jeannie states breastfeeding is going well.     Education on positioning and alignment. Mom is encouraged to:     - Bring baby up to the breast (use of pillows to elevate so baby's torso is against mom's breasts)   - Skin to skin for feedings with top hand exposed to show signs of satiation   - Chin deep into breast tissue (make baby look up to the nipple)   - nose aligned to the nipple   -Wait for wide gape, drag chin on the breast so nipple is aimed at the upper, back palate  - Cheek should be touching breast   - Deep, firm hold of baby with ear, shoulder, hip alignment    Mom is encouraged to meet early feeding cues, allow for non-nutritive suck, use breast compressions, and monitor baby's output. Mom can review feeding logs to verify if baby is meeting daily output of wet and soiled diapers.     Review of comfort measures when milk comes in/engorged. Use of heat before feedings, offering both breasts often and ensuring baby has a deep latch for best milk transfer, use of cool compress for up to 20 minutes between feedings if necessary.    Recommendations of delaying bottles and pacifier use for one month while breast milk supply is initiating.     Signs of a deep versus shallow latch visualized and discussed. Initial latch on pain explained as lasting under a minute at the beginning of a feed. If pain continues, the suction of the latch should be broken using a finger in the baby's mouth and a new latch should be tried. There is no way to get more areolar tissue and a wider gape of baby's mouth without delatching first.       Provided with the Ready Set Breastfeeding and Discharge Breastfeeding Booklets. Mom encouraged to call the [facility] for Lactation Support outside of the hospital and utilize [facility] for Plugged Ducts should they occur with no relief this postpartum period.    Jeannie can call for lactation support as needed, and can request latching support from  her nursing staff overnight.       Myah Morrell 3/2/2024 7:52 PM

## 2024-03-03 NOTE — PLAN OF CARE
Problem: PAIN - ADULT  Goal: Verbalizes/displays adequate comfort level or baseline comfort level  Description: Interventions:  - Encourage patient to monitor pain and request assistance  - Assess pain using appropriate pain scale  - Administer analgesics based on type and severity of pain and evaluate response  - Implement non-pharmacological measures as appropriate and evaluate response  - Consider cultural and social influences on pain and pain management  - Notify physician/advanced practitioner if interventions unsuccessful or patient reports new pain  Outcome: Progressing     Problem: INFECTION - ADULT  Goal: Absence or prevention of progression during hospitalization  Description: INTERVENTIONS:  - Assess and monitor for signs and symptoms of infection  - Monitor lab/diagnostic results  - Monitor all insertion sites, i.e. indwelling lines, tubes, and drains  - Monitor endotracheal if appropriate and nasal secretions for changes in amount and color  - [place] appropriate cooling/warming therapies per order  - Administer medications as ordered  - Instruct and encourage patient and family to use good hand hygiene technique  - Identify and instruct in appropriate isolation precautions for identified infection/condition  Outcome: Progressing  Goal: Absence of fever/infection during neutropenic period  Description: INTERVENTIONS:  - Monitor WBC    Outcome: Progressing     Problem: SAFETY ADULT  Goal: Patient will remain free of falls  Description: INTERVENTIONS:  - Educate patient/family on patient safety including physical limitations  - Instruct patient to call for assistance with activity   - Consult OT/PT to assist with strengthening/mobility   - Keep Call bell within reach  - Keep bed low and locked with side rails adjusted as appropriate  - Keep care items and personal belongings within reach  - Initiate and maintain comfort rounds  - Make Fall Risk Sign visible to staff  - Offer Toileting every  Hours,  in advance of need  - Initiate/Maintain alarm  - Obtain necessary fall risk management equipment:   - Apply yellow socks and bracelet for high fall risk patients  - Consider moving patient to room near nurses station  Outcome: Progressing  Goal: Maintain or return to baseline ADL function  Description: INTERVENTIONS:  -  Assess patient's ability to carry out ADLs; assess patient's baseline for ADL function and identify physical deficits which impact ability to perform ADLs (bathing, care of mouth/teeth, toileting, grooming, dressing, etc.)  - Assess/evaluate cause of self-care deficits   - Assess range of motion  - Assess patient's mobility; develop plan if impaired  - Assess patient's need for assistive devices and provide as appropriate  - Encourage maximum independence but intervene and supervise when necessary  - Involve family in performance of ADLs  - Assess for home care needs following discharge   - Consider OT consult to assist with ADL evaluation and planning for discharge  - Provide patient education as appropriate  Outcome: Progressing  Goal: Maintains/Returns to pre admission functional level  Description: INTERVENTIONS:  - Perform AM-PAC 6 Click Basic Mobility/ Daily Activity assessment daily.  - Set and communicate daily mobility goal to care team and patient/family/caregiver.   - Collaborate with rehabilitation services on mobility goals if consulted  - Perform Range of Motion  times a day.  - Reposition patient every  hours.  - Dangle patient  times a day  - Stand patient  times a day  - Ambulate patient  times a day  - Out of bed to chair  times a day   - Out of bed for meals  times a day  - Out of bed for toileting  - Record patient progress and toleration of activity level   Outcome: Progressing     Problem: DISCHARGE PLANNING  Goal: Discharge to home or other facility with appropriate resources  Description: INTERVENTIONS:  - Identify barriers to discharge w/patient and caregiver  - Arrange for  needed discharge resources and transportation as appropriate  - Identify discharge learning needs (meds, wound care, etc.)  - Arrange for interpretive services to assist at discharge as needed  - Refer to Case Management Department for coordinating discharge planning if the patient needs post-hospital services based on physician/advanced practitioner order or complex needs related to functional status, cognitive ability, or social support system  Outcome: Progressing     Problem: POSTPARTUM  Goal: Experiences normal postpartum course  Description: INTERVENTIONS:  - Monitor maternal vital signs  - Assess uterine involution and lochia  Outcome: Progressing  Goal: Appropriate maternal -  bonding  Description: INTERVENTIONS:  - Identify family support  - Assess for appropriate maternal/infant bonding   -Encourage maternal/infant bonding opportunities  - Referral to  or  as needed  Outcome: Progressing  Goal: Establishment of infant feeding pattern  Description: INTERVENTIONS:  - Assess breast/bottle feeding  - Refer to lactation as needed  Outcome: Progressing  Goal: Incision(s), wounds(s) or drain site(s) healing without S/S of infection  Description: INTERVENTIONS  - Assess and document dressing, incision, wound bed, drain sites and surrounding tissue  - Provide patient and family education  - Perform skin care/dressing changes every   Outcome: Progressing     Problem: ALTERATION IN THE BREAST  Goal: Optimize infant feeding at the breast  Description: INTERVENTIONS:  - Latch, breast and nipple assessment  - Assess prior breast feeding history  - Hand expression of breast milk  - For cracked, bleeding and or sore nipples reassess latch, treat damaged nipple  -Educate mother on feeding cues  -Positioning/latch techniques  Outcome: Progressing     Problem: INADEQUATE LATCH, SUCK OR SWALLOW  Goal: Demonstrate ability to latch and sustain latch, audible swallowing and satiety  Description:  INTERVENTIONS:  - Assess oral anatomy, notify Physician/AP for abnormal findings  - Establish milk expression  - Maximize feeding opportunity (skin to skin, behavioral state)  - Position/latch techniques  - Discourage use of pacifier-artificial nipple  - Mechanical pumping  - Nipple Shield  - Supplemental formula feeding (Physician/AP order)  - Alternative feeding method  Outcome: Progressing

## 2024-03-03 NOTE — PLAN OF CARE
Problem: PAIN - ADULT  Goal: Verbalizes/displays adequate comfort level or baseline comfort level  Description: Interventions:  - Encourage patient to monitor pain and request assistance  - Assess pain using appropriate pain scale  - Administer analgesics based on type and severity of pain and evaluate response  - Implement non-pharmacological measures as appropriate and evaluate response  - Consider cultural and social influences on pain and pain management  - Notify physician/advanced practitioner if interventions unsuccessful or patient reports new pain  Outcome: Progressing     Problem: INFECTION - ADULT  Goal: Absence or prevention of progression during hospitalization  Description: INTERVENTIONS:  - Assess and monitor for signs and symptoms of infection  - Monitor lab/diagnostic results  - Monitor all insertion sites, i.e. indwelling lines, tubes, and drains  - Monitor endotracheal if appropriate and nasal secretions for changes in amount and color  - [place] appropriate cooling/warming therapies per order  - Administer medications as ordered  - Instruct and encourage patient and family to use good hand hygiene technique  - Identify and instruct in appropriate isolation precautions for identified infection/condition  Outcome: Progressing  Goal: Absence of fever/infection during neutropenic period  Description: INTERVENTIONS:  - Monitor WBC    Outcome: Progressing     Problem: SAFETY ADULT  Goal: Patient will remain free of falls  Description: INTERVENTIONS:  - Educate patient/family on patient safety including physical limitations  - Instruct patient to call for assistance with activity   - Consult OT/PT to assist with strengthening/mobility   - Keep Call bell within reach  - Keep bed low and locked with side rails adjusted as appropriate  - Keep care items and personal belongings within reach  - Initiate and maintain comfort rounds  - Make Fall Risk Sign visible to staff  - Apply yellow socks and bracelet  for high fall risk patients  - Consider moving patient to room near nurses station  Outcome: Progressing  Goal: Maintain or return to baseline ADL function  Description: INTERVENTIONS:  -  Assess patient's ability to carry out ADLs; assess patient's baseline for ADL function and identify physical deficits which impact ability to perform ADLs (bathing, care of mouth/teeth, toileting, grooming, dressing, etc.)  - Assess/evaluate cause of self-care deficits   - Assess range of motion  - Assess patient's mobility; develop plan if impaired  - Assess patient's need for assistive devices and provide as appropriate  - Encourage maximum independence but intervene and supervise when necessary  - Involve family in performance of ADLs  - Assess for home care needs following discharge   - Consider OT consult to assist with ADL evaluation and planning for discharge  - Provide patient education as appropriate  Outcome: Progressing  Goal: Maintains/Returns to pre admission functional level  Description: INTERVENTIONS:  - Perform AM-PAC 6 Click Basic Mobility/ Daily Activity assessment daily.  - Set and communicate daily mobility goal to care team and patient/family/caregiver.   - Collaborate with rehabilitation services on mobility goals if consulted  - Out of bed for toileting  - Record patient progress and toleration of activity level   Outcome: Progressing     Problem: DISCHARGE PLANNING  Goal: Discharge to home or other facility with appropriate resources  Description: INTERVENTIONS:  - Identify barriers to discharge w/patient and caregiver  - Arrange for needed discharge resources and transportation as appropriate  - Identify discharge learning needs (meds, wound care, etc.)  - Arrange for interpretive services to assist at discharge as needed  - Refer to Case Management Department for coordinating discharge planning if the patient needs post-hospital services based on physician/advanced practitioner order or complex needs  related to functional status, cognitive ability, or social support system  Outcome: Progressing     Problem: POSTPARTUM  Goal: Experiences normal postpartum course  Description: INTERVENTIONS:  - Monitor maternal vital signs  - Assess uterine involution and lochia  Outcome: Progressing  Goal: Appropriate maternal -  bonding  Description: INTERVENTIONS:  - Identify family support  - Assess for appropriate maternal/infant bonding   -Encourage maternal/infant bonding opportunities  - Referral to  or  as needed  Outcome: Progressing  Goal: Establishment of infant feeding pattern  Description: INTERVENTIONS:  - Assess breast/bottle feeding  - Refer to lactation as needed  Outcome: Progressing  Goal: Incision(s), wounds(s) or drain site(s) healing without S/S of infection  Description: INTERVENTIONS  - Assess and document dressing, incision, wound bed, drain sites and surrounding tissue  - Provide patient and family education  Outcome: Progressing     Problem: ALTERATION IN THE BREAST  Goal: Optimize infant feeding at the breast  Description: INTERVENTIONS:  - Latch, breast and nipple assessment  - Assess prior breast feeding history  - Hand expression of breast milk  - For cracked, bleeding and or sore nipples reassess latch, treat damaged nipple  -Educate mother on feeding cues  -Positioning/latch techniques  Outcome: Progressing     Problem: INADEQUATE LATCH, SUCK OR SWALLOW  Goal: Demonstrate ability to latch and sustain latch, audible swallowing and satiety  Description: INTERVENTIONS:  - Assess oral anatomy, notify Physician/AP for abnormal findings  - Establish milk expression  - Maximize feeding opportunity (skin to skin, behavioral state)  - Position/latch techniques  - Discourage use of pacifier-artificial nipple  - Mechanical pumping  - Nipple Shield  - Supplemental formula feeding (Physician/AP order)  - Alternative feeding method  Outcome: Progressing

## 2024-03-03 NOTE — PROGRESS NOTES
Obstetrics Progress Note  Jeannie Carr 33 y.o. female MRN: [id number]  Unit/Bed#: -01 Encounter: [id number]    Assessment/Plan:    Postpartum Day #1 s/p , currently stable. Baby in  room. By issue:    *  (spontaneous vaginal delivery)  Assessment & Plan       Continue routine post partum care  Pain well controlled: tylenol/motrin scheduled  Lochia within normal limits: continue to monitor   OOB: as able, encourage ambulation  Passing flatus  Voiding spontaneously  Breastfeeding  Baby in: room  Dispo: anticipate d/c home PPD1-2      Genital herpes affecting pregnancy in third trimester  Assessment & Plan  On valtrex  Patient unable to tolerate speculum exam prior to epidural  Immediately after epidural placement patient noted to be complete and +3, started pushing secondary to category 2 fetal heart tracing   No lesions noted on external exam        Subjective/Objective     Subjective:   No acute events overnight. Pain: controlled. Tolerating PO: yes. Voiding: spontaneously. Flatus: passing. Ambulating: yes. Chest pain: no. Shortness of breath: no. Leg pain: no. Lochia: within normal limits. Baby in room, feeding via breast.    Objective:   Vitals:   Temp:  [97 °F (36.1 °C)-98.6 °F (37 °C)] 97.9 °F (36.6 °C)  HR:  [65-92] 65  Resp:  [16-18] 18  BP: (109-137)/(56-91) 119/71       Intake/Output Summary (Last 24 hours) at 3/3/2024 0224  Last data filed at 3/2/2024 1638  Gross per 24 hour   Intake --   Output 1096 ml   Net -1096 ml       Lab Results   Component Value Date    WBC 12.02 (H) 2024    HGB 12.6 2024    HCT 37.1 2024    MCV 90 2024     2024       Physical Exam:   General: alert and oriented x3, in no apparent distress  Cardiovascular: regular rate  Pulmonary: normal effort  Abdomen: Soft, non-tender, non-distended, no rebound or guarding. Uterine fundus firm and non-tender, at the umbilicus  Extremities: Non tender with no edema      Fabi Prince  MD  PGY-I, OBGYN  3/3/2024, 2:24 AM

## 2024-03-08 LAB — PLACENTA IN STORAGE: NORMAL

## 2024-03-12 ENCOUNTER — TELEPHONE (OUTPATIENT)
Age: 34
End: 2024-03-12

## 2024-03-14 ENCOUNTER — TELEPHONE (OUTPATIENT)
Dept: OBGYN CLINIC | Facility: MEDICAL CENTER | Age: 34
End: 2024-03-14

## 2024-03-14 NOTE — TELEPHONE ENCOUNTER
Jeannie is moved up to the correct week for a post partum appointment and Lara is reaching out to find out about the certificate from the hospital. Will follow up once we hear back!

## 2024-03-15 ENCOUNTER — TELEPHONE (OUTPATIENT)
Dept: OBGYN CLINIC | Facility: CLINIC | Age: 34
End: 2024-03-15

## 2024-03-15 NOTE — TELEPHONE ENCOUNTER
Contacted patient regarding birth card and footprint and she requested that she come pick it up as she needs it ASAP to add baby onto insurance. Told her we would reach out when we find out from Jacki Rhodes if that is possible.

## 2024-03-21 DIAGNOSIS — A60.09 GENITAL HERPES AFFECTING PREGNANCY IN THIRD TRIMESTER: ICD-10-CM

## 2024-03-21 DIAGNOSIS — O98.313 GENITAL HERPES AFFECTING PREGNANCY IN THIRD TRIMESTER: ICD-10-CM

## 2024-03-21 RX ORDER — VALACYCLOVIR HYDROCHLORIDE 500 MG/1
500 TABLET, FILM COATED ORAL 2 TIMES DAILY
Qty: 180 TABLET | Refills: 1 | Status: SHIPPED | OUTPATIENT
Start: 2024-03-21 | End: 2024-09-17

## 2024-04-04 ENCOUNTER — POSTPARTUM VISIT (OUTPATIENT)
Dept: OBGYN CLINIC | Facility: CLINIC | Age: 34
End: 2024-04-04
Payer: COMMERCIAL

## 2024-04-04 VITALS — WEIGHT: 136 LBS | DIASTOLIC BLOOD PRESSURE: 72 MMHG | SYSTOLIC BLOOD PRESSURE: 120 MMHG | BODY MASS INDEX: 23.34 KG/M2

## 2024-04-04 DIAGNOSIS — Z01.419 ENCNTR FOR GYN EXAM (GENERAL) (ROUTINE) W/O ABN FINDINGS: ICD-10-CM

## 2024-04-04 DIAGNOSIS — Z30.09 ENCOUNTER FOR COUNSELING REGARDING CONTRACEPTION: ICD-10-CM

## 2024-04-04 PROBLEM — A60.09 GENITAL HERPES AFFECTING PREGNANCY IN THIRD TRIMESTER: Status: RESOLVED | Noted: 2024-01-16 | Resolved: 2024-04-04

## 2024-04-04 PROBLEM — O98.313 GENITAL HERPES AFFECTING PREGNANCY IN THIRD TRIMESTER: Status: RESOLVED | Noted: 2024-01-16 | Resolved: 2024-04-04

## 2024-04-04 PROBLEM — O47.9 UTERINE CONTRACTIONS DURING PREGNANCY: Status: RESOLVED | Noted: 2024-02-16 | Resolved: 2024-04-04

## 2024-04-04 PROBLEM — Z87.59 HISTORY OF PRIOR PREGNANCY WITH SMALL FOR GESTATIONAL AGE NEWBORN: Status: RESOLVED | Noted: 2024-01-16 | Resolved: 2024-04-04

## 2024-04-04 PROCEDURE — G0145 SCR C/V CYTO,THINLAYER,RESCR: HCPCS | Performed by: STUDENT IN AN ORGANIZED HEALTH CARE EDUCATION/TRAINING PROGRAM

## 2024-04-04 PROCEDURE — G0476 HPV COMBO ASSAY CA SCREEN: HCPCS | Performed by: STUDENT IN AN ORGANIZED HEALTH CARE EDUCATION/TRAINING PROGRAM

## 2024-04-04 RX ORDER — NORETHINDRONE ACETATE AND ETHINYL ESTRADIOL .02; 1 MG/1; MG/1
1 TABLET ORAL DAILY
Qty: 28 TABLET | Refills: 6 | Status: SHIPPED | OUTPATIENT
Start: 2024-04-04 | End: 2024-10-17

## 2024-04-04 NOTE — PROGRESS NOTES
Assessment/Plan:     (spontaneous vaginal delivery)  -doing well post partum   -EPDS 4  -recommend 12-18 months in between deliveries.   -return prn/annual     Encntr for gyn exam (general) (routine) w/o abn findings  -pap collected today    Encounter for counseling regarding contraception  -discussed risks, benefits, alternatives of all appropriate contraceptives discussed. Pt would like to proceed with OCPs at this time.   -Ocps sent to pharmacy. Instructions on use provided. Side effects discussed.        Diagnoses and all orders for this visit:     (spontaneous vaginal delivery)    Encntr for gyn exam (general) (routine) w/o abn findings  -     Liquid-based pap, screening    Encounter for counseling regarding contraception  -     norethindrone-ethinyl estradiol (Junel 1/20) 1-20 MG-MCG per tablet; Take 1 tablet by mouth daily          Subjective:      Patient ID: Jeannie Carr is a 33 y.o. female.    34yo  s/p  3/2/24 presents for post partum visit. Pt denies vaginal bleeding. Voiding, having bowel movements. Has good support at home. Bottle feeding. Denies SI/HI. Would like to discuss OCPs.       The following portions of the patient's history were reviewed and updated as appropriate: allergies, current medications, past family history, past medical history, past social history, past surgical history, and problem list.    Review of Systems   Constitutional:  Negative for appetite change, chills, fatigue and fever.   Respiratory:  Negative for cough, chest tightness, shortness of breath and wheezing.    Cardiovascular:  Negative for chest pain, palpitations and leg swelling.   Gastrointestinal:  Negative for abdominal distention, abdominal pain, constipation, diarrhea, nausea and vomiting.   Endocrine: Negative for cold intolerance, heat intolerance and polyuria.   Genitourinary:  Negative for difficulty urinating, dyspareunia, dysuria, genital sores, menstrual problem, vaginal bleeding,  vaginal discharge and vaginal pain.   Neurological:  Negative for dizziness, weakness, light-headedness and headaches.         Objective:      /72 (BP Location: Right arm, Patient Position: Sitting, Cuff Size: Standard)   Wt 61.7 kg (136 lb)   LMP 06/07/2023 (Exact Date)   Breastfeeding No   BMI 23.34 kg/m²          Physical Exam  Constitutional:       General: She is not in acute distress.     Appearance: Normal appearance. She is normal weight.   Cardiovascular:      Rate and Rhythm: Normal rate.   Pulmonary:      Effort: Pulmonary effort is normal. No respiratory distress.   Abdominal:      General: There is no distension.      Palpations: There is no mass.      Tenderness: There is no abdominal tenderness. There is no guarding or rebound.   Genitourinary:     General: Normal vulva.      Exam position: Lithotomy position.      Pubic Area: No rash.       Labia:         Right: No rash, tenderness, lesion or injury.         Left: No rash, tenderness, lesion or injury.       Urethra: No prolapse, urethral pain, urethral swelling or urethral lesion.      Vagina: No signs of injury. No vaginal discharge, tenderness, bleeding, lesions or prolapsed vaginal walls.      Cervix: No cervical motion tenderness, discharge, friability, lesion or erythema.      Uterus: Not deviated, not enlarged, not fixed, not tender and no uterine prolapse.       Adnexa:         Right: No mass, tenderness or fullness.          Left: No mass, tenderness or fullness.     Musculoskeletal:      Right lower leg: No edema.      Left lower leg: No edema.   Neurological:      Mental Status: She is alert and oriented to person, place, and time.   Psychiatric:         Mood and Affect: Mood normal.

## 2024-04-04 NOTE — PROGRESS NOTES
Post partum visit  Vaginal delivery 3/2/2024  Apgars 6/8  Baby's wt 6lb11.1oz   -Pap ***  -Breastfeeding No   -BC Condoms   -EPDS scale

## 2024-04-04 NOTE — ASSESSMENT & PLAN NOTE
-doing well post partum   -EPDS 4  -recommend 12-18 months in between deliveries.   -return prn/annual

## 2024-04-04 NOTE — ASSESSMENT & PLAN NOTE
-discussed risks, benefits, alternatives of all appropriate contraceptives discussed. Pt would like to proceed with OCPs at this time.   -Ocps sent to pharmacy. Instructions on use provided. Side effects discussed.

## 2024-04-05 LAB
HPV HR 12 DNA CVX QL NAA+PROBE: NEGATIVE
HPV16 DNA CVX QL NAA+PROBE: NEGATIVE
HPV18 DNA CVX QL NAA+PROBE: NEGATIVE

## 2024-04-12 LAB
LAB AP GYN PRIMARY INTERPRETATION: NORMAL
Lab: NORMAL

## 2024-05-04 PROBLEM — Z01.419 ENCNTR FOR GYN EXAM (GENERAL) (ROUTINE) W/O ABN FINDINGS: Status: RESOLVED | Noted: 2024-04-04 | Resolved: 2024-05-04

## 2024-05-30 ENCOUNTER — TELEPHONE (OUTPATIENT)
Dept: PSYCHIATRY | Facility: CLINIC | Age: 34
End: 2024-05-30

## 2024-05-30 NOTE — TELEPHONE ENCOUNTER
Patient has been added to the Medication Management wait list without a referral.    Insurance: [employer]  Insurance Type:    Commercial [x]   Medicaid []   [facility] (if applicable)   Medicare []  Location Preference: [place]  Provider Preference: female  Virtual: Yes [] No [x]  Were outside resources sent: Yes [x] No []

## 2024-05-31 ENCOUNTER — APPOINTMENT (EMERGENCY)
Dept: CT IMAGING | Facility: HOSPITAL | Age: 34
End: 2024-05-31
Payer: COMMERCIAL

## 2024-05-31 ENCOUNTER — HOSPITAL ENCOUNTER (EMERGENCY)
Facility: HOSPITAL | Age: 34
Discharge: HOME | End: 2024-05-31
Attending: EMERGENCY MEDICINE
Payer: COMMERCIAL

## 2024-05-31 VITALS
SYSTOLIC BLOOD PRESSURE: 122 MMHG | HEART RATE: 69 BPM | DIASTOLIC BLOOD PRESSURE: 75 MMHG | RESPIRATION RATE: 17 BRPM | TEMPERATURE: 97.5 F | OXYGEN SATURATION: 100 %

## 2024-05-31 DIAGNOSIS — R10.9 ABDOMINAL PAIN: Primary | ICD-10-CM

## 2024-05-31 DIAGNOSIS — R11.2 NAUSEA AND VOMITING: ICD-10-CM

## 2024-05-31 LAB
ALBUMIN SERPL BCP-MCNC: 4.6 G/DL (ref 3.5–5)
ALP SERPL-CCNC: 64 U/L (ref 34–104)
ALT SERPL W P-5'-P-CCNC: 12 U/L (ref 7–52)
ANION GAP SERPL CALCULATED.3IONS-SCNC: 6 MMOL/L (ref 4–13)
AST SERPL W P-5'-P-CCNC: 23 U/L (ref 13–39)
BASOPHILS # BLD AUTO: 0.03 THOUSANDS/ÂΜL (ref 0–0.1)
BASOPHILS NFR BLD AUTO: 1 % (ref 0–1)
BILIRUB SERPL-MCNC: 0.48 MG/DL (ref 0.2–1)
BILIRUB UR QL STRIP: NEGATIVE
BUN SERPL-MCNC: 18 MG/DL (ref 5–25)
CALCIUM SERPL-MCNC: 9.3 MG/DL (ref 8.4–10.2)
CARDIAC TROPONIN I PNL SERPL HS: <2 NG/L
CHLORIDE SERPL-SCNC: 104 MMOL/L (ref 96–108)
CLARITY UR: CLEAR
CO2 SERPL-SCNC: 26 MMOL/L (ref 21–32)
COLOR UR: YELLOW
CREAT SERPL-MCNC: 0.87 MG/DL (ref 0.6–1.3)
EOSINOPHIL # BLD AUTO: 0.21 THOUSAND/ÂΜL (ref 0–0.61)
EOSINOPHIL NFR BLD AUTO: 3 % (ref 0–6)
ERYTHROCYTE [DISTWIDTH] IN BLOOD BY AUTOMATED COUNT: 12.3 % (ref 11.6–15.1)
GFR SERPL CREATININE-BSD FRML MDRD: 88 ML/MIN/1.73SQ M
GLUCOSE SERPL-MCNC: 105 MG/DL (ref 65–140)
GLUCOSE UR STRIP-MCNC: NEGATIVE MG/DL
HCG SERPL QL: NEGATIVE
HCT VFR BLD AUTO: 44.4 % (ref 34.8–46.1)
HGB BLD-MCNC: 14.4 G/DL (ref 11.5–15.4)
HGB UR QL STRIP.AUTO: NEGATIVE
IMM GRANULOCYTES # BLD AUTO: 0.03 THOUSAND/UL (ref 0–0.2)
IMM GRANULOCYTES NFR BLD AUTO: 1 % (ref 0–2)
KETONES UR STRIP-MCNC: NEGATIVE MG/DL
LEUKOCYTE ESTERASE UR QL STRIP: NEGATIVE
LIPASE SERPL-CCNC: 17 U/L (ref 11–82)
LYMPHOCYTES # BLD AUTO: 1.37 THOUSANDS/ÂΜL (ref 0.6–4.47)
LYMPHOCYTES NFR BLD AUTO: 21 % (ref 14–44)
MCH RBC QN AUTO: 30.3 PG (ref 26.8–34.3)
MCHC RBC AUTO-ENTMCNC: 32.4 G/DL (ref 31.4–37.4)
MCV RBC AUTO: 93 FL (ref 82–98)
MONOCYTES # BLD AUTO: 0.25 THOUSAND/ÂΜL (ref 0.17–1.22)
MONOCYTES NFR BLD AUTO: 4 % (ref 4–12)
NEUTROPHILS # BLD AUTO: 4.5 THOUSANDS/ÂΜL (ref 1.85–7.62)
NEUTS SEG NFR BLD AUTO: 70 % (ref 43–75)
NITRITE UR QL STRIP: NEGATIVE
NRBC BLD AUTO-RTO: 0 /100 WBCS
PH UR STRIP.AUTO: 5.5 [PH]
PLATELET # BLD AUTO: 226 THOUSANDS/UL (ref 149–390)
PMV BLD AUTO: 10.8 FL (ref 8.9–12.7)
POTASSIUM SERPL-SCNC: 4.5 MMOL/L (ref 3.5–5.3)
PROT SERPL-MCNC: 7.8 G/DL (ref 6.4–8.4)
PROT UR STRIP-MCNC: NEGATIVE MG/DL
RBC # BLD AUTO: 4.76 MILLION/UL (ref 3.81–5.12)
SODIUM SERPL-SCNC: 136 MMOL/L (ref 135–147)
SP GR UR STRIP.AUTO: >=1.03 (ref 1–1.03)
UROBILINOGEN UR QL STRIP.AUTO: 0.2 E.U./DL
WBC # BLD AUTO: 6.39 THOUSAND/UL (ref 4.31–10.16)

## 2024-05-31 PROCEDURE — 96375 TX/PRO/DX INJ NEW DRUG ADDON: CPT

## 2024-05-31 PROCEDURE — 84703 CHORIONIC GONADOTROPIN ASSAY: CPT

## 2024-05-31 PROCEDURE — 80053 COMPREHEN METABOLIC PANEL: CPT

## 2024-05-31 PROCEDURE — 74177 CT ABD & PELVIS W/CONTRAST: CPT

## 2024-05-31 PROCEDURE — 81003 URINALYSIS AUTO W/O SCOPE: CPT

## 2024-05-31 PROCEDURE — 85025 COMPLETE CBC W/AUTO DIFF WBC: CPT

## 2024-05-31 PROCEDURE — 96365 THER/PROPH/DIAG IV INF INIT: CPT

## 2024-05-31 PROCEDURE — 99285 EMERGENCY DEPT VISIT HI MDM: CPT | Performed by: EMERGENCY MEDICINE

## 2024-05-31 PROCEDURE — 84484 ASSAY OF TROPONIN QUANT: CPT

## 2024-05-31 PROCEDURE — 99284 EMERGENCY DEPT VISIT MOD MDM: CPT

## 2024-05-31 PROCEDURE — 83690 ASSAY OF LIPASE: CPT

## 2024-05-31 PROCEDURE — 36415 COLL VENOUS BLD VENIPUNCTURE: CPT

## 2024-05-31 PROCEDURE — 93005 ELECTROCARDIOGRAM TRACING: CPT

## 2024-05-31 RX ORDER — ONDANSETRON 4 MG/1
4 TABLET, ORALLY DISINTEGRATING ORAL EVERY 6 HOURS PRN
Qty: 20 TABLET | Refills: 0 | Status: SHIPPED | OUTPATIENT
Start: 2024-05-31

## 2024-05-31 RX ORDER — ONDANSETRON 2 MG/ML
4 INJECTION INTRAMUSCULAR; INTRAVENOUS ONCE
Status: COMPLETED | OUTPATIENT
Start: 2024-05-31 | End: 2024-05-31

## 2024-05-31 RX ORDER — PANTOPRAZOLE SODIUM 40 MG/1
40 TABLET, DELAYED RELEASE ORAL DAILY
Start: 2024-05-31 | End: 2024-06-06 | Stop reason: CLARIF

## 2024-05-31 RX ORDER — FAMOTIDINE 10 MG/ML
20 INJECTION, SOLUTION INTRAVENOUS ONCE
Status: COMPLETED | OUTPATIENT
Start: 2024-05-31 | End: 2024-05-31

## 2024-05-31 RX ORDER — ACETAMINOPHEN 10 MG/ML
1000 INJECTION, SOLUTION INTRAVENOUS ONCE
Status: COMPLETED | OUTPATIENT
Start: 2024-05-31 | End: 2024-05-31

## 2024-05-31 RX ORDER — SODIUM CHLORIDE, SODIUM GLUCONATE, SODIUM ACETATE, POTASSIUM CHLORIDE, MAGNESIUM CHLORIDE, SODIUM PHOSPHATE, DIBASIC, AND POTASSIUM PHOSPHATE .53; .5; .37; .037; .03; .012; .00082 G/100ML; G/100ML; G/100ML; G/100ML; G/100ML; G/100ML; G/100ML
1000 INJECTION, SOLUTION INTRAVENOUS ONCE
Status: COMPLETED | OUTPATIENT
Start: 2024-05-31 | End: 2024-05-31

## 2024-05-31 RX ADMIN — FAMOTIDINE 20 MG: 10 INJECTION INTRAVENOUS at 09:58

## 2024-05-31 RX ADMIN — IOHEXOL 100 ML: 350 INJECTION, SOLUTION INTRAVENOUS at 10:36

## 2024-05-31 RX ADMIN — ONDANSETRON 4 MG: 2 INJECTION INTRAMUSCULAR; INTRAVENOUS at 09:58

## 2024-05-31 RX ADMIN — ACETAMINOPHEN 1000 MG: 10 INJECTION INTRAVENOUS at 09:58

## 2024-05-31 RX ADMIN — SODIUM CHLORIDE, SODIUM GLUCONATE, SODIUM ACETATE, POTASSIUM CHLORIDE, MAGNESIUM CHLORIDE, SODIUM PHOSPHATE, DIBASIC, AND POTASSIUM PHOSPHATE 1000 ML: .53; .5; .37; .037; .03; .012; .00082 INJECTION, SOLUTION INTRAVENOUS at 09:57

## 2024-05-31 NOTE — Clinical Note
Jeannie Carr was seen and treated in our emergency department on 5/31/2024.                Diagnosis:     Jeannie  .    She may return on this date: 06/03/2024         If you have any questions or concerns, please don't hesitate to call.      Maci Leo MD    ______________________________           _______________          _______________  Hospital Representative                              Date                                Time

## 2024-05-31 NOTE — DISCHARGE INSTRUCTIONS
You were evaluated in the emergency department for: abdominal pain. You can access your current and pending results through [facility]'s BackTrack.    - You should follow-up with your primary care provider, as soon as possible, for re-evaluation.  If you do not have a primary care provider, I have referred you to [facility]. You will be contacted about scheduling an appointment. Their phone number is also included on this paperwork.  - You have been prescribed zofran, an antinausea medication, and protonix  - You should also follow-up with gastroenterology, their information is on this sheet    Please, return to the emergency department if you experience new or worsening symptoms, fever, chest pain, shortness of breath, difficulty breathing, dizziness, abdominal pain, persistent nausea/vomiting, syncope or passing out, blood in your urine or stool, coughing up blood, leg swelling/pain, urinary retention, bowel or bladder incontinence, numbness between your legs.

## 2024-05-31 NOTE — ED PROVIDER NOTES
History  Chief Complaint   Patient presents with    Abdominal Pain     Pt started with mid addominal  pain that shoots to low back. Reports diarrhea and increased belching, nausea, vomiting. Denies fever     HPI    Jeannie Carr is a 33 y.o. female with history of GERD, IBS presenting for abdominal pain.    Patient reports starting on Tuesday, 4 days ago, she had a burning epigastric pain with associated subjective subjective fevers, chills, nausea, vomiting. Reports felt a bit different than her previous GERD symptoms but improved by Thursday but symptoms returned today though she reports the pain feels worse and now radiates to her back as well.  Denies chest pain, shortness of breath, palpitations, urinary frequency, dysuria, new extremity weakness, swelling, pain. No abdominal surgeries. Of note she underwent uncomplicated  in march.    Prior to Admission Medications   Prescriptions Last Dose Informant Patient Reported? Taking?   FLUoxetine (PROzac) 10 mg capsule   Yes No   FLUoxetine (PROzac) 20 mg capsule   Yes No   Lactobacillus (PROBIOTIC ACIDOPHILUS PO)   Yes No   Sig: Take by mouth   Prenatal Vit-Fe Fumarate-FA (PRENATAL VITAMINS PLUS PO)   Yes No   Sig: Take by mouth   mometasone (ELOCON) 0.1 % cream   Yes No   Sig: APPLY TOPICALLY TO THE AFFECTED AREAS EVERY DAY   norethindrone-ethinyl estradiol (Junel 1/20) 1-20 MG-MCG per tablet   No No   Sig: Take 1 tablet by mouth daily   omeprazole (PriLOSEC) 20 mg delayed release capsule  Self Yes No   Sig: Take 20 mg by mouth daily      Facility-Administered Medications: None       Past Medical History:   Diagnosis Date    Abnormal Pap smear of cervix 2014    2019 Lsil   wnl    Herpes     hx of- one outbreak - none since    Perineal pain in female 2018    Varicella     had vaccines       Past Surgical History:   Procedure Laterality Date    TOOTH EXTRACTION         Family History   Problem Relation Age of Onset    Hypertension Mother     Rosy  Hypertension Mother     Hypertension Father     No Known Problems Sister     No Known Problems Brother     No Known Problems Son     Hypertension Maternal Grandmother     Stroke Maternal Grandmother     Malig Hypertension Maternal Grandmother     Heart attack Maternal Grandfather     No Known Problems Paternal Grandmother     Stroke Paternal Grandfather     Other Family         Cardiac Disorder    Stroke Family     Hypertension Family      I have reviewed and agree with the history as documented.    E-Cigarette/Vaping    E-Cigarette Use Never User      E-Cigarette/Vaping Substances    Nicotine No     THC No     CBD No     Flavoring No     Other No     Unknown No      Social History     Tobacco Use    Smoking status: Never    Smokeless tobacco: Never   Vaping Use    Vaping status: Never Used   Substance Use Topics    Alcohol use: Not Currently     Comment: none with pregnancy    Drug use: Yes     Types: Marijuana     Comment: pre-children, marijuana, denies , pt's brother- susbstance abuse issues, PGF- ETOH abuse        Review of Systems   Constitutional:  Positive for appetite change (decrease), chills and fever.   HENT:  Negative for congestion, hearing loss and trouble swallowing.    Eyes:  Negative for pain and visual disturbance.   Respiratory:  Negative for cough and shortness of breath.    Cardiovascular:  Negative for chest pain, palpitations and leg swelling.   Gastrointestinal:  Positive for abdominal pain, diarrhea, nausea and vomiting. Negative for constipation.   Genitourinary:  Negative for dysuria, frequency, hematuria, pelvic pain, vaginal bleeding and vaginal discharge.   Musculoskeletal:  Negative for arthralgias and back pain.   Skin:  Negative for color change and rash.   Neurological:  Negative for dizziness, seizures, syncope, weakness, light-headedness and headaches.   Psychiatric/Behavioral:  Negative for dysphoric mood.    All other systems reviewed and are negative.      Physical  Exam  ED Triage Vitals [05/31/24 0851]   Temperature Pulse Respirations Blood Pressure SpO2   97.5 °F (36.4 °C) 69 17 122/75 100 %      Temp Source Heart Rate Source Patient Position - Orthostatic VS BP Location FiO2 (%)   Oral Monitor Sitting Right arm --      Pain Score       7             Orthostatic Vital Signs  Vitals:    05/31/24 0851   BP: 122/75   Pulse: 69   Patient Position - Orthostatic VS: Sitting       Physical Exam  Vitals and nursing note reviewed.   Constitutional:       General: She is not in acute distress.     Appearance: She is well-developed.   HENT:      Head: Normocephalic and atraumatic.      Mouth/Throat:      Mouth: Mucous membranes are moist.      Pharynx: Oropharynx is clear.   Eyes:      Extraocular Movements: Extraocular movements intact.      Conjunctiva/sclera: Conjunctivae normal.   Cardiovascular:      Rate and Rhythm: Normal rate and regular rhythm.      Pulses: Normal pulses.      Heart sounds: Normal heart sounds. No murmur heard.  Pulmonary:      Effort: Pulmonary effort is normal. No respiratory distress.      Breath sounds: Normal breath sounds. No wheezing, rhonchi or rales.   Abdominal:      General: Abdomen is flat.      Palpations: Abdomen is soft.      Tenderness: There is generalized abdominal tenderness. There is no right CVA tenderness, left CVA tenderness or rebound.   Musculoskeletal:      Cervical back: Normal range of motion.      Right lower leg: No edema.      Left lower leg: No edema.   Skin:     General: Skin is warm and dry.      Capillary Refill: Capillary refill takes less than 2 seconds.   Neurological:      General: No focal deficit present.      Mental Status: She is alert and oriented to person, place, and time.      Sensory: No sensory deficit.      Motor: No weakness.   Psychiatric:         Mood and Affect: Mood normal.         Behavior: Behavior normal.         ED Medications  Medications   ondansetron (ZOFRAN) injection 4 mg (4 mg Intravenous Given  5/31/24 0958)   Famotidine (PF) (PEPCID) injection 20 mg (20 mg Intravenous Given 5/31/24 0958)   acetaminophen (Ofirmev) injection 1,000 mg (0 mg Intravenous Stopped 5/31/24 1013)   multi-electrolyte (ISOLYTE-S PH 7.4 equivalent) IV solution 1,000 mL (0 mL Intravenous Stopped 5/31/24 1057)   iohexol (OMNIPAQUE) 350 MG/ML injection (MULTI-DOSE) 100 mL (100 mL Intravenous Given 5/31/24 1036)       Diagnostic Studies  Results Reviewed       Procedure Component Value Units Date/Time    hCG, qualitative pregnancy [[id number]]  (Normal) Collected: 05/31/24 0922    Lab Status: Final result Specimen: Blood from Arm, Right Updated: 05/31/24 1026     Preg, Serum Negative    UA w Reflex to Microscopic w Reflex to Culture [[id number]] Collected: 05/31/24 0917    Lab Status: Final result Specimen: Urine, Clean Catch Updated: 05/31/24 1016     Color, UA Yellow     Clarity, UA Clear     Specific Gravity, UA >=1.030     pH, UA 5.5     Leukocytes, UA Negative     Nitrite, UA Negative     Protein, UA Negative mg/dl      Glucose, UA Negative mg/dl      Ketones, UA Negative mg/dl      Urobilinogen, UA 0.2 E.U./dl      Bilirubin, UA Negative     Occult Blood, UA Negative    HS Troponin 0hr (reflex protocol) [[id number]]  (Normal) Collected: 05/31/24 0922    Lab Status: Final result Specimen: Blood from Arm, Right Updated: 05/31/24 1001     hs TnI 0hr <2 ng/L     Comprehensive metabolic panel [[id number]] Collected: 05/31/24 0922    Lab Status: Final result Specimen: Blood from Arm, Right Updated: 05/31/24 0959     Sodium 136 mmol/L      Potassium 4.5 mmol/L      Chloride 104 mmol/L      CO2 26 mmol/L      ANION GAP 6 mmol/L      BUN 18 mg/dL      Creatinine 0.87 mg/dL      Glucose 105 mg/dL      Calcium 9.3 mg/dL      AST 23 U/L      ALT 12 U/L      Alkaline Phosphatase 64 U/L      Total Protein 7.8 g/dL      Albumin 4.6 g/dL      Total Bilirubin 0.48 mg/dL      eGFR 88 ml/min/1.73sq m     Narrative:      [employer]  guidelines for Chronic Kidney Disease (CKD):     Stage 1 with normal or high GFR (GFR > 90 mL/min/1.73 square meters)    Stage 2 Mild CKD (GFR = 60-89 mL/min/1.73 square meters)    Stage 3A Moderate CKD (GFR = 45-59 mL/min/1.73 square meters)    Stage 3B Moderate CKD (GFR = 30-44 mL/min/1.73 square meters)    Stage 4 Severe CKD (GFR = 15-29 mL/min/1.73 square meters)    Stage 5 End Stage CKD (GFR <15 mL/min/1.73 square meters)  Note: GFR calculation is accurate only with a steady state creatinine    Lipase [[id number]]  (Normal) Collected: 05/31/24 0922    Lab Status: Final result Specimen: Blood from Arm, Right Updated: 05/31/24 0959     Lipase 17 u/L     CBC and differential [[id number]] Collected: 05/31/24 0922    Lab Status: Final result Specimen: Blood from Arm, Right Updated: 05/31/24 0936     WBC 6.39 Thousand/uL      RBC 4.76 Million/uL      Hemoglobin 14.4 g/dL      Hematocrit 44.4 %      MCV 93 fL      MCH 30.3 pg      MCHC 32.4 g/dL      RDW 12.3 %      MPV 10.8 fL      Platelets 226 Thousands/uL      nRBC 0 /100 WBCs      Segmented % 70 %      Immature Grans % 1 %      Lymphocytes % 21 %      Monocytes % 4 %      Eosinophils Relative 3 %      Basophils Relative 1 %      Absolute Neutrophils 4.50 Thousands/µL      Absolute Immature Grans 0.03 Thousand/uL      Absolute Lymphocytes 1.37 Thousands/µL      Absolute Monocytes 0.25 Thousand/µL      Eosinophils Absolute 0.21 Thousand/µL      Basophils Absolute 0.03 Thousands/µL                    CT abdomen pelvis with contrast   Final Result by China Caceres MD (05/31 1120)      No evidence of acute appendicitis   No bowel wall thickening.   Fluid-filled colon, correlate with diarrheal illness      Workstation performed: [id number]               Procedures  ECG 12 Lead Documentation Only    Date/Time: 5/31/2024 9:32 AM    Performed by: Maci Leo MD  Authorized by: Maci Leo MD    Indications / Diagnosis:  Abdominal pain  ECG reviewed by me,  the ED Provider: yes    Patient location:  ED  Previous ECG:     Previous ECG:  Compared to current    Comparison ECG info:  12/18/2023    Similarity:  No change    Comparison to cardiac monitor: Yes    Interpretation:     Interpretation: normal    Rate:     ECG rate:  64    ECG rate assessment: normal    Rhythm:     Rhythm: sinus rhythm    Ectopy:     Ectopy: none    QRS:     QRS axis:  Normal    QRS intervals:  Normal  Conduction:     Conduction: normal    ST segments:     ST segments:  Normal  T waves:     T waves: normal    Comments:      QTc 433        ED Course  ED Course as of 05/31/24 2008   Fri May 31, 2024   0855 Blood Pressure: 122/75  122/75, HR 69, RR 17, SpO2 100%   0940 CBC and differential  Normal, no leukocytosis   0959 LIPASE: 17  normal   1000 Comprehensive metabolic panel  normal   1003 hs TnI 0hr: <2  Normal. Onset of symptoms starting a few days ago, unlikely to be ACS at this time   1017 UA w Reflex to Microscopic w Reflex to Culture  normal   1026 PREGNANCY, SERUM: Negative  Pregnancy negative   1026 CT abdomen pelvis with contrast  Pending CT   1033 CT abdomen pelvis with contrast  Exam begun   1104 CT abdomen pelvis with contrast  Pending CT read   1125 CT abdomen pelvis with contrast  IMPRESSION:     No evidence of acute appendicitis  No bowel wall thickening.  Fluid-filled colon, correlate with diarrheal illness             HEART Risk Score      Flowsheet Row Most Recent Value   Heart Score Risk Calculator    History 1 Filed at: 05/31/2024 1004   ECG 0 Filed at: 05/31/2024 1004   Age 0 Filed at: 05/31/2024 1004   Risk Factors 0 Filed at: 05/31/2024 1004   Troponin 0 Filed at: 05/31/2024 1004   HEART Score 1 Filed at: 05/31/2024 1004                        SBIRT 22yo+      Flowsheet Row Most Recent Value   Initial Alcohol Screen: US AUDIT-C     1. How often do you have a drink containing alcohol? 0 Filed at: 05/31/2024 1009   2. How many drinks containing alcohol do you have on a typical  day you are drinking?  0 Filed at: 05/31/2024 1009   3a. Male UNDER 65: How often do you have five or more drinks on one occasion? 0 Filed at: 05/31/2024 1009   3b. FEMALE Any Age, or MALE 65+: How often do you have 4 or more drinks on one occassion? 0 Filed at: 05/31/2024 1009   Audit-C Score 0 Filed at: 05/31/2024 1009   LENORA: How many times in the past year have you...    Used an illegal drug or used a prescription medication for non-medical reasons? Never Filed at: 05/31/2024 1009                  Medical Decision Making  Jeannie Carr is a 33 y.o. female presenting for abdominal pain with associated burning epigastric pain with associated subjective subjective fevers, chills, nausea, vomiting.  Afebrile, vital signs within normal limits.  Exam showed generalized abdominal tenderness, no rebound, no guarding.     DDx including but not limited to: appendicitis, gastroenteritis, gastritis, PUD, GERD, gastroparesis, hepatitis, pancreatitis, colitis, enteritis, food poisoning, mesenteric adenitis, epiploic appendagitis, IBD, IBS, ileus, bowel obstruction, volvulus, cholecystitis, biliary colic, choledocholithiasis, perforated viscus, tumor, splenic etiology, diverticulitis, internal hernia, constipation, pelvic pathology, renal colic, pyelonephritis, UTI.     Lab work unremarkable, CT without evidence of acute surgical intraabdominal process, fluid filled colon. Patient give Tylenol, Pepcid, Zofran, a liter of fluids with improvement of symptoms.     Based on these results and H&P, patient likely experiencing a viral gastrointestinal disease, doubt acute surgical intraabdominal process. Results and clinical impressions were discussed with patient and family. They expressed understanding. All questions and concerns were addressed in ED. Discussed strict return precautions.  Discussed importance of following up with PCP in the next few days, also provided information for gastroenterology in the event symptoms do not  improve. Also discharged with Zofran prn for nausea.  All questions answered.  Patient is agreeable to discharge.    Amount and/or Complexity of Data Reviewed  Independent Historian: feli  External Data Reviewed: labs, radiology and notes.  Labs: ordered. Decision-making details documented in ED Course.  Radiology: ordered. Decision-making details documented in ED Course.  ECG/medicine tests: ordered and independent interpretation performed.    Risk  OTC drugs.  Prescription drug management.          Disposition  Final diagnoses:   Abdominal pain   Nausea and vomiting     Time reflects when diagnosis was documented in both MDM as applicable and the Disposition within this note       Time User Action Codes Description Comment    5/31/2024 11:30 AM Maci Leo Add [R10.9] Abdominal pain     5/31/2024 11:30 AM Maci Leo Add [R11.2] Nausea and vomiting           ED Disposition       ED Disposition   Discharge    Condition   Stable    Date/Time   Fri May 31, 2024 1142    Comment   Jeannie Carr discharge to home/self care.                   Follow-up Information       Follow up With Specialties Details Why Contact Info Additional Information    Rohit Chiu MD Family Medicine Go to  Re-evaluation of symptoms [address]  [address]  [facility] [address]  [phone]       [facility] Gastro[facility] Specialty [facility]  Re-evaluation of symptoms [address] [address]  [phone] [facility] Gastroenter[facility] Specialists [facility], [address] [address], [place], [address], [phone]    [facility] Emergency Medicine Go to  As needed, If symptoms worsen [address] [facility] [place] [address] [phone] [facility], [address] [place], [place], [address]            Discharge Medication List as of  5/31/2024 11:49 AM        START taking these medications    Details   ondansetron (ZOFRAN-ODT) 4 mg disintegrating tablet Take 1 tablet (4 mg total) by mouth every 6 (six) hours as needed for nausea or vomiting for up to 20 doses, Starting Fri 5/31/2024, Normal      pantoprazole (PROTONIX) 40 mg tablet Take 1 tablet (40 mg total) by mouth daily for 20 days, Starting Fri 5/31/2024, Until Thu 6/20/2024, No Print           CONTINUE these medications which have NOT CHANGED    Details   !! FLUoxetine (PROzac) 10 mg capsule Historical Med      !! FLUoxetine (PROzac) 20 mg capsule Historical Med      Lactobacillus (PROBIOTIC ACIDOPHILUS PO) Take by mouth, Historical Med      mometasone (ELOCON) 0.1 % cream APPLY TOPICALLY TO THE AFFECTED AREAS EVERY DAY, Historical Med      norethindrone-ethinyl estradiol (Junel 1/20) 1-20 MG-MCG per tablet Take 1 tablet by mouth daily, Starting Thu 4/4/2024, Until Thu 10/17/2024, Normal      omeprazole (PriLOSEC) 20 mg delayed release capsule Take 20 mg by mouth daily, Historical Med      Prenatal Vit-Fe Fumarate-FA (PRENATAL VITAMINS PLUS PO) Take by mouth, Historical Med       !! - Potential duplicate medications found. Please discuss with provider.        No discharge procedures on file.    PDMP Review       None             ED Provider  Attending physically available and evaluated Jeannie Carr. I managed the patient along with the ED Attending.    Electronically Signed by           Maci Leo MD  05/31/24 2005       Maci Leo MD  05/31/24 2008

## 2024-05-31 NOTE — ED ATTENDING ATTESTATION
5/31/2024  I, Fabienne Sloan MD, saw and evaluated the patient. I have discussed the patient with the resident/non-physician practitioner and agree with the resident's/non-physician practitioner's findings, Plan of Care, and MDM as documented in the resident's/non-physician practitioner's note, except where noted. All available labs and Radiology studies were reviewed.  I was present for key portions of any procedure(s) performed by the resident/non-physician practitioner and I was immediately available to provide assistance.       At this point I agree with the current assessment done in the Emergency Department.  I have conducted an independent evaluation of this patient a history and physical is as follows:    33-year-old female with history of GERD, IBS, uncomplicated spontaneous vaginal delivery in March.  Patient presents for evaluation of 4 days of abdominal pain.  Described as burning,-was initially epigastric but is now periumbilical radiating to the bilateral lower quadrants.  Accompanied by nausea and occasional vomiting, last episode of vomiting was this morning.  She is tolerating some oral intake.  Yesterday she felt fine but then symptoms recurred today.  Reports a couple of episodes of nonbloody nonblack diarrhea daily.  Pain also is present in the bilateral lower back.  No fevers or chills.  No known sick contacts.  Denies dysuria, frequency, vaginal bleeding, vaginal discharge.    Physical Exam  Vitals and nursing note reviewed.   Constitutional:       General: She is not in acute distress.     Appearance: She is well-developed. She is not ill-appearing, toxic-appearing or diaphoretic.   HENT:      Head: Normocephalic and atraumatic.      Right Ear: External ear normal.      Left Ear: External ear normal.      Nose: Nose normal.   Eyes:      Conjunctiva/sclera: Conjunctivae normal.   Cardiovascular:      Rate and Rhythm: Normal rate and regular rhythm.      Pulses: Normal pulses.      Heart  sounds: Normal heart sounds. No murmur heard.     No friction rub. No gallop.   Pulmonary:      Effort: Pulmonary effort is normal. No respiratory distress.      Breath sounds: Normal breath sounds. No wheezing or rales.   Abdominal:      General: Bowel sounds are normal. There is no distension.      Palpations: Abdomen is soft.      Tenderness: There is abdominal tenderness (TTP in the periumbical region and bilateral lower quadrants). There is no right CVA tenderness, left CVA tenderness or guarding.   Musculoskeletal:         General: No deformity. Normal range of motion.      Cervical back: Normal range of motion and neck supple.      Right lower leg: No edema.      Left lower leg: No edema.   Skin:     General: Skin is warm and dry.   Neurological:      Mental Status: She is alert and oriented to person, place, and time.      Motor: No abnormal muscle tone.   Psychiatric:         Mood and Affect: Mood normal.           ED Course  ED Course as of 05/31/24 1928   Fri May 31, 2024   1125 CT scan of the abdomen pelvis shows a fluid-filled colon consistent with diarrheal illness, no evidence of appendicitis or other acute surgical intra-abdominal process.  Patient is not pregnant.  Single troponin is undetectable and EKG is unremarkable.  Suspect that patient's symptoms are secondary to gastritis as she describes a burning sensation throughout her abdomen.  She has not taken omeprazole for some time, will start protonix, discussed dietary modifications and recommendation for follow-up with gastroenterology for further evaluation.  Reasons to return to the emergency department discussed.   1126 Heart score not calculated in the absence of chest pain.   1128 I personally interpreted the patient's EKG which reveals normal rate, NSR, normal axis, normal intervals, no ischemic changes.          Critical Care Time  Procedures

## 2024-06-02 LAB
ATRIAL RATE: 64 BPM
P AXIS: 82 DEGREES
PR INTERVAL: 170 MS
QRS AXIS: 50 DEGREES
QRSD INTERVAL: 74 MS
QT INTERVAL: 420 MS
QTC INTERVAL: 433 MS
T WAVE AXIS: 66 DEGREES
VENTRICULAR RATE: 64 BPM

## 2024-06-02 PROCEDURE — 93010 ELECTROCARDIOGRAM REPORT: CPT | Performed by: INTERNAL MEDICINE

## 2024-06-06 ENCOUNTER — OFFICE VISIT (OUTPATIENT)
Dept: GASTROENTEROLOGY | Facility: CLINIC | Age: 34
End: 2024-06-06
Payer: COMMERCIAL

## 2024-06-06 VITALS
SYSTOLIC BLOOD PRESSURE: 104 MMHG | WEIGHT: 132.2 LBS | BODY MASS INDEX: 22.57 KG/M2 | TEMPERATURE: 97.9 F | DIASTOLIC BLOOD PRESSURE: 60 MMHG | HEIGHT: 64 IN

## 2024-06-06 DIAGNOSIS — K58.9 IRRITABLE BOWEL SYNDROME, UNSPECIFIED TYPE: ICD-10-CM

## 2024-06-06 DIAGNOSIS — K21.9 GASTROESOPHAGEAL REFLUX DISEASE, UNSPECIFIED WHETHER ESOPHAGITIS PRESENT: Primary | ICD-10-CM

## 2024-06-06 PROCEDURE — 99204 OFFICE O/P NEW MOD 45 MIN: CPT | Performed by: INTERNAL MEDICINE

## 2024-06-06 RX ORDER — DICYCLOMINE HCL 20 MG
20 TABLET ORAL EVERY 6 HOURS PRN
Qty: 120 TABLET | Refills: 4 | Status: SHIPPED | OUTPATIENT
Start: 2024-06-06

## 2024-06-06 NOTE — PROGRESS NOTES
[facility] - Outpatient Consultation  Jeannie Carr 33 y.o. female MRN: [id number]  Encounter: [id number]          ASSESSMENT AND PLAN:  33 year old female here for evaluation.      1. Gastroesophageal reflux disease, unspecified whether esophagitis present  -continue PPI  -Check for H. pylori and celiac disease, she does have a first-degree relative with celiac disease, her sister  -Will also check CRP    2. Irritable bowel syndrome, unspecified type      ______________________________________________________________________    HPI:  33 year old female referred by the ER.  Reports 4 months ago started to have lower cramping pain that started in the lower abdomen and radiated to the back and it was associated with diarrhea.  Thought she was in labor.  Went to the ER and was diagnosed with a GI bug.  Symptoms went away and now one week ago she got similar symptoms and again went to the ER and had a CT scan which showed    Half sister has Celiac disease and Crohn's disease.  Dad has had colitis.    Since Friday, she cut out gluten completely but is not feeling better.      In 2018, she had seen Dr. Mena for severe abdominal pain more in the epigastrium and was diagnosed with GERD and took omeprazole for 6-8 months.    This is different than before.    In between these episodes has normal bowel movements.  Normally has a bristol stool of 4    Non smoker, drinks alcohol occasional.    2 sons.    Throughout the pregnancy had severe perioral dermatitis that started with her pregnancy and was treated with antibiotics by mouth and topically as well.    Thinks she also has itching on her joints.      Has had nausea and vomiting and diarrhea that is asociated with the flares.  Occasional burping with sulfur taste.      Been on omeprazole 20 mg since 2018 but increased it to 40 mg during her pregnancy and this was controlling her symptoms.              REVIEW OF SYSTEMS:    CONSTITUTIONAL: Denies  any fever, chills, rigors, and weight loss.  HEENT: No earache or tinnitus. Denies hearing loss or visual disturbances.  CARDIOVASCULAR: No chest pain or palpitations.   RESPIRATORY: Denies any cough, hemoptysis, shortness of breath or dyspnea on exertion.  GASTROINTESTINAL: As noted in the History of Present Illness.   GENITOURINARY: No problems with urination. Denies any hematuria or dysuria.  NEUROLOGIC: No dizziness or vertigo, denies headaches.   MUSCULOSKELETAL: Denies any muscle or joint pain.   SKIN: Denies skin rashes or itching.   ENDOCRINE: Denies excessive thirst. Denies intolerance to heat or cold.  PSYCHOSOCIAL: Denies depression or anxiety. Denies any recent memory loss.       Historical Information   Past Medical History:   Diagnosis Date   • Abnormal Pap smear of cervix 01/2014    2019 Lsil  2023 wnl   • Herpes     hx of- one outbreak 2014- none since   • Perineal pain in female 06/2018   • Varicella     had vaccines     Past Surgical History:   Procedure Laterality Date   • TOOTH EXTRACTION       Social History   Social History     Substance and Sexual Activity   Alcohol Use Not Currently    Comment: none with pregnancy     Social History     Substance and Sexual Activity   Drug Use Yes   • Types: Marijuana    Comment: pre-children, marijuana, denies , pt's brother- susbstance abuse issues, PGF- ETOH abuse     Social History     Tobacco Use   Smoking Status Never   Smokeless Tobacco Never     Family History   Problem Relation Age of Onset   • Hypertension Mother    • Malig Hypertension Mother    • Hypertension Father    • No Known Problems Sister    • No Known Problems Brother    • No Known Problems Son    • Hypertension Maternal Grandmother    • Stroke Maternal Grandmother    • Malig Hypertension Maternal Grandmother    • Heart attack Maternal Grandfather    • No Known Problems Paternal Grandmother    • Stroke Paternal Grandfather    • Other Family         Cardiac Disorder   • Stroke Family  "   • Hypertension Family        Meds/Allergies       Current Outpatient Medications:   •  dicyclomine (BENTYL) 20 mg tablet  •  FLUoxetine (PROzac) 10 mg capsule  •  FLUoxetine (PROzac) 20 mg capsule  •  Lactobacillus (PROBIOTIC ACIDOPHILUS PO)  •  mometasone (ELOCON) 0.1 % cream  •  norethindrone-ethinyl estradiol (Junel 1/20) 1-20 MG-MCG per tablet  •  omeprazole (PriLOSEC) 20 mg delayed release capsule  •  ondansetron (ZOFRAN-ODT) 4 mg disintegrating tablet    No Known Allergies        Objective     Blood pressure 104/60, temperature 97.9 °F (36.6 °C), height 5' 4\" (1.626 m), weight 60 kg (132 lb 3.2 oz), not currently breastfeeding. Body mass index is 22.69 kg/m².        PHYSICAL EXAM:      General Appearance:   Alert, cooperative, no distress   HEENT:   Normocephalic, atraumatic, anicteric.     Neck:  Supple, symmetrical, trachea midline   Lungs:   Clear to auscultation bilaterally; no rales, rhonchi or wheezing; respirations unlabored    Heart::   Regular rate and rhythm; no murmur, rub, or gallop.   Abdomen:   Soft, non-tender, non-distended; normal bowel sounds; no masses, no organomegaly    Genitalia:   Deferred    Rectal:   Deferred    Extremities:  No cyanosis, clubbing or edema    Pulses:  2+ and symmetric    Skin:  No jaundice, rashes, or lesions    Lymph nodes:  No palpable cervical lymphadenopathy        Lab Results:   No visits with results within 1 Day(s) from this visit.   Latest known visit with results is:   Admission on 05/31/2024, Discharged on 05/31/2024   Component Date Value   • WBC 05/31/2024 6.39    • RBC 05/31/2024 4.76    • Hemoglobin 05/31/2024 14.4    • Hematocrit 05/31/2024 44.4    • MCV 05/31/2024 93    • MCH 05/31/2024 30.3    • MCHC 05/31/2024 32.4    • RDW 05/31/2024 12.3    • MPV 05/31/2024 10.8    • Platelets 05/31/2024 226    • nRBC 05/31/2024 0    • Segmented % 05/31/2024 70    • Immature Grans % 05/31/2024 1    • Lymphocytes % 05/31/2024 21    • Monocytes % 05/31/2024 4    • " Eosinophils Relative 05/31/2024 3    • Basophils Relative 05/31/2024 1    • Absolute Neutrophils 05/31/2024 4.50    • Absolute Immature Grans 05/31/2024 0.03    • Absolute Lymphocytes 05/31/2024 1.37    • Absolute Monocytes 05/31/2024 0.25    • Eosinophils Absolute 05/31/2024 0.21    • Basophils Absolute 05/31/2024 0.03    • Sodium 05/31/2024 136    • Potassium 05/31/2024 4.5    • Chloride 05/31/2024 104    • CO2 05/31/2024 26    • ANION GAP 05/31/2024 6    • BUN 05/31/2024 18    • Creatinine 05/31/2024 0.87    • Glucose 05/31/2024 105    • Calcium 05/31/2024 9.3    • AST 05/31/2024 23    • ALT 05/31/2024 12    • Alkaline Phosphatase 05/31/2024 64    • Total Protein 05/31/2024 7.8    • Albumin 05/31/2024 4.6    • Total Bilirubin 05/31/2024 0.48    • eGFR 05/31/2024 88    • Lipase 05/31/2024 17    • hs TnI 0hr 05/31/2024 <2    • Color, UA 05/31/2024 Yellow    • Clarity, UA 05/31/2024 Clear    • Specific Gravity, UA 05/31/2024 >=1.030    • pH, UA 05/31/2024 5.5    • Leukocytes, UA 05/31/2024 Negative    • Nitrite, UA 05/31/2024 Negative    • Protein, UA 05/31/2024 Negative    • Glucose, UA 05/31/2024 Negative    • Ketones, UA 05/31/2024 Negative    • Urobilinogen, UA 05/31/2024 0.2    • Bilirubin, UA 05/31/2024 Negative    • Occult Blood, UA 05/31/2024 Negative    • Ventricular Rate 05/31/2024 64    • Atrial Rate 05/31/2024 64    • OR Interval 05/31/2024 170    • QRSD Interval 05/31/2024 74    • QT Interval 05/31/2024 420    • QTC Interval 05/31/2024 433    • P Axis 05/31/2024 82    • QRS Axis 05/31/2024 50    • T Wave Axis 05/31/2024 66    • Preg, Serum 05/31/2024 Negative          Radiology Results:   CT abdomen pelvis with contrast    Result Date: 5/31/2024  Narrative: CT ABDOMEN AND PELVIS WITH IV CONTRAST INDICATION: periumbilical abdominal pain, vomiting, diarrhea. COMPARISON: None. TECHNIQUE: CT examination of the abdomen and pelvis was performed. Multiplanar 2D reformatted images were created from the source  data. This examination, like all CT scans performed in the [facility], was performed utilizing techniques to minimize radiation dose exposure, including the use of iterative reconstruction and automated exposure control. Radiation dose length product (DLP) for this visit: 490.15 mGy-cm IV Contrast: 100 mL of iohexol (OMNIPAQUE) Enteric Contrast: Not administered. FINDINGS: ABDOMEN LOWER CHEST: No clinically significant abnormality in the visualized lower chest. LIVER/BILIARY TREE: Unremarkable. GALLBLADDER: No calcified gallstones. No pericholecystic inflammatory change. SPLEEN: Unremarkable. PANCREAS: Unremarkable. ADRENAL GLANDS: Unremarkable. KIDNEYS/URETERS: Unremarkable. No hydronephrosis. STOMACH AND BOWEL: Fluid-filled colon seen, this can be correlated with a diarrheal illness APPENDIX: No findings to suggest appendicitis. ABDOMINOPELVIC CAVITY: Trace amount of fluid within the pelvic cul-de-sac. No pneumoperitoneum. No lymphadenopathy. VESSELS: Unremarkable for patient's age. PELVIS REPRODUCTIVE ORGANS: Unremarkable for patient's age. URINARY BLADDER: Unremarkable. ABDOMINAL WALL/INGUINAL REGIONS: Unremarkable. BONES: No acute fracture or suspicious osseous lesion.     Impression: No evidence of acute appendicitis No bowel wall thickening. Fluid-filled colon, correlate with diarrheal illness Workstation performed: [id number]

## 2024-06-07 ENCOUNTER — APPOINTMENT (OUTPATIENT)
Dept: LAB | Facility: CLINIC | Age: 34
End: 2024-06-07
Payer: COMMERCIAL

## 2024-06-07 ENCOUNTER — APPOINTMENT (OUTPATIENT)
Dept: LAB | Age: 34
End: 2024-06-07
Payer: COMMERCIAL

## 2024-06-07 DIAGNOSIS — K58.9 IRRITABLE BOWEL SYNDROME, UNSPECIFIED TYPE: ICD-10-CM

## 2024-06-07 LAB
CRP SERPL QL: 5.8 MG/L
GLIADIN PEPTIDE IGA SER-ACNC: <0.2 U/ML
GLIADIN PEPTIDE IGA SER-ACNC: NEGATIVE
GLIADIN PEPTIDE IGG SER-ACNC: <0.4 U/ML
GLIADIN PEPTIDE IGG SER-ACNC: NEGATIVE
IGA SERPL-MCNC: 128 MG/DL (ref 66–433)
TSH SERPL DL<=0.05 MIU/L-ACNC: 2.56 UIU/ML (ref 0.45–4.5)
TTG IGA SER-ACNC: <0.5 U/ML
TTG IGA SER-ACNC: NEGATIVE
TTG IGG SER-ACNC: <0.8 U/ML
TTG IGG SER-ACNC: NEGATIVE

## 2024-06-07 PROCEDURE — 86364 TISS TRNSGLTMNASE EA IG CLAS: CPT

## 2024-06-07 PROCEDURE — 82784 ASSAY IGA/IGD/IGG/IGM EACH: CPT

## 2024-06-07 PROCEDURE — 36415 COLL VENOUS BLD VENIPUNCTURE: CPT

## 2024-06-07 PROCEDURE — 86140 C-REACTIVE PROTEIN: CPT

## 2024-06-07 PROCEDURE — 86258 DGP ANTIBODY EACH IG CLASS: CPT

## 2024-06-07 PROCEDURE — 84443 ASSAY THYROID STIM HORMONE: CPT

## 2024-06-07 PROCEDURE — 87338 HPYLORI STOOL AG IA: CPT

## 2024-06-07 PROCEDURE — 83993 ASSAY FOR CALPROTECTIN FECAL: CPT

## 2024-06-09 LAB — H PYLORI AG STL QL IA: NEGATIVE

## 2024-06-16 LAB — CALPROTECTIN STL-MCNT: 56 UG/G (ref 0–120)

## 2024-06-20 DIAGNOSIS — K58.9 IRRITABLE BOWEL SYNDROME, UNSPECIFIED TYPE: ICD-10-CM

## 2024-06-21 RX ORDER — DICYCLOMINE HCL 20 MG
TABLET ORAL
Qty: 360 TABLET | Refills: 1 | Status: SHIPPED | OUTPATIENT
Start: 2024-06-21

## 2024-06-25 ENCOUNTER — PATIENT MESSAGE (OUTPATIENT)
Dept: FAMILY MEDICINE CLINIC | Facility: CLINIC | Age: 34
End: 2024-06-25

## 2024-06-27 DIAGNOSIS — F41.9 ANXIETY: Primary | ICD-10-CM

## 2024-06-27 RX ORDER — FLUOXETINE HYDROCHLORIDE 20 MG/1
20 CAPSULE ORAL DAILY
Qty: 30 CAPSULE | Refills: 5 | Status: SHIPPED | OUTPATIENT
Start: 2024-06-27

## 2024-07-16 DIAGNOSIS — K58.9 IRRITABLE BOWEL SYNDROME, UNSPECIFIED TYPE: ICD-10-CM

## 2024-07-16 RX ORDER — DICYCLOMINE HCL 20 MG
TABLET ORAL
Qty: 400 TABLET | Refills: 1 | Status: SHIPPED | OUTPATIENT
Start: 2024-07-16 | End: 2024-07-25 | Stop reason: ALTCHOICE

## 2024-07-19 NOTE — PATIENT COMMUNICATION
Pt was calling in to see if Dr. Chiu has review her medical records and is able to call in the prescription for the Vyvanse (30 mg). Pt mentioned she is completely out of this medication and needs a refill. Pt would like this refill sent to the [facility] on [address] [place] [address]. Pt is requesting a call back once this prescription is sent over to the pharmacy. Please advise with the pt if needed thank you.

## 2024-07-25 ENCOUNTER — ANNUAL EXAM (OUTPATIENT)
Dept: OBGYN CLINIC | Facility: CLINIC | Age: 34
End: 2024-07-25
Payer: COMMERCIAL

## 2024-07-25 VITALS
BODY MASS INDEX: 22.91 KG/M2 | SYSTOLIC BLOOD PRESSURE: 120 MMHG | DIASTOLIC BLOOD PRESSURE: 64 MMHG | HEIGHT: 64 IN | WEIGHT: 134.2 LBS

## 2024-07-25 DIAGNOSIS — Z01.419 ENCOUNTER FOR GYNECOLOGICAL EXAMINATION (GENERAL) (ROUTINE) WITHOUT ABNORMAL FINDINGS: Primary | ICD-10-CM

## 2024-07-25 DIAGNOSIS — F90.0 ADHD, PREDOMINANTLY INATTENTIVE TYPE: Primary | ICD-10-CM

## 2024-07-25 PROCEDURE — 99395 PREV VISIT EST AGE 18-39: CPT | Performed by: STUDENT IN AN ORGANIZED HEALTH CARE EDUCATION/TRAINING PROGRAM

## 2024-07-25 RX ORDER — LISDEXAMFETAMINE DIMESYLATE 30 MG/1
30 CAPSULE ORAL EVERY MORNING
Qty: 30 CAPSULE | Refills: 0 | Status: SHIPPED | OUTPATIENT
Start: 2024-07-25

## 2024-07-25 NOTE — ASSESSMENT & PLAN NOTE
-pap: 4/4/24 NILM HPV neg   -LMP: 7/12/24  -regular monthly menstrual cycles, denies dysmenorrhea or menorrhagia   -sexually active, denies dyspareunia, condoms for contraception  -STD testing: denies   -smoking: denies   -drinks alcohol socially  -recommend 30 min of exercise daily   -denies family history of ovarian, breast, colon cancer  -return in one year or earlier if needed

## 2024-07-25 NOTE — PROGRESS NOTES
"Ambulatory Visit  Name: Jeannie Carr      : 1990      MRN: [id number]  Encounter Provider: Trinidad Wen DO  Encounter Date: 2024   Encounter department: [facility] [place]    Assessment & Plan   1. Encounter for gynecological examination (general) (routine) without abnormal findings  Assessment & Plan:  -pap: 24 NILM HPV neg   -LMP: 24  -regular monthly menstrual cycles, denies dysmenorrhea or menorrhagia   -sexually active, denies dyspareunia, condoms for contraception  -STD testing: denies   -smoking: denies   -drinks alcohol socially  -recommend 30 min of exercise daily   -denies family history of ovarian, breast, colon cancer  -return in one year or earlier if needed       History of Present Illness     Jeannie Carr is a 33 y.o. female  LMP 24 presents for annual exam. Has no acute complaints.     Review of Systems   Constitutional:  Negative for appetite change, chills, fatigue and fever.   Respiratory:  Negative for cough, chest tightness, shortness of breath and wheezing.    Cardiovascular:  Negative for chest pain, palpitations and leg swelling.   Gastrointestinal:  Negative for abdominal distention, abdominal pain, constipation, diarrhea, nausea and vomiting.   Endocrine: Negative for cold intolerance, heat intolerance and polyuria.   Genitourinary:  Negative for difficulty urinating, dyspareunia, dysuria, genital sores, menstrual problem, vaginal bleeding, vaginal discharge and vaginal pain.   Neurological:  Negative for dizziness, weakness, light-headedness and headaches.       Objective     /64 (BP Location: Right arm, Patient Position: Sitting, Cuff Size: Standard)   Ht 5' 3.78\" (1.62 m)   Wt 60.9 kg (134 lb 3.2 oz)   LMP 2024 (Exact Date)   BMI 23.19 kg/m²     Physical Exam  Constitutional:       General: She is not in acute distress.     Appearance: Normal appearance. She is not ill-appearing. "   Cardiovascular:      Rate and Rhythm: Normal rate.   Pulmonary:      Effort: Pulmonary effort is normal. No respiratory distress.   Chest:   Breasts:     Breasts are symmetrical.      Right: Normal. No swelling, bleeding, inverted nipple, mass, nipple discharge, skin change or tenderness.      Left: Normal. No swelling, bleeding, inverted nipple, mass, nipple discharge, skin change or tenderness.   Abdominal:      General: Abdomen is flat. There is no distension.      Palpations: Abdomen is soft.      Tenderness: There is no abdominal tenderness. There is no guarding or rebound.   Genitourinary:     General: Normal vulva.      Exam position: Lithotomy position.      Labia:         Right: No rash, tenderness, lesion or injury.         Left: No rash, tenderness, lesion or injury.       Vagina: Normal. No foreign body. No vaginal discharge, erythema, tenderness, bleeding or lesions.      Cervix: Normal. No cervical motion tenderness, discharge, friability, lesion, erythema, cervical bleeding or eversion.      Uterus: Normal. Not enlarged, not fixed, not tender and no uterine prolapse.       Adnexa: Right adnexa normal and left adnexa normal.        Right: No mass, tenderness or fullness.          Left: No mass, tenderness or fullness.     Musculoskeletal:      Right lower leg: No edema.      Left lower leg: No edema.   Lymphadenopathy:      Upper Body:      Right upper body: No supraclavicular, axillary or pectoral adenopathy.      Left upper body: No supraclavicular, axillary or pectoral adenopathy.   Neurological:      General: No focal deficit present.      Mental Status: She is alert and oriented to person, place, and time.   Psychiatric:         Mood and Affect: Mood normal.         Behavior: Behavior normal.         Thought Content: Thought content normal.         Judgment: Judgment normal.       Administrative Statements

## 2024-08-06 ENCOUNTER — OFFICE VISIT (OUTPATIENT)
Dept: FAMILY MEDICINE CLINIC | Facility: CLINIC | Age: 34
End: 2024-08-06
Payer: COMMERCIAL

## 2024-08-06 VITALS
OXYGEN SATURATION: 98 % | HEART RATE: 84 BPM | SYSTOLIC BLOOD PRESSURE: 120 MMHG | HEIGHT: 64 IN | WEIGHT: 134 LBS | BODY MASS INDEX: 22.88 KG/M2 | DIASTOLIC BLOOD PRESSURE: 78 MMHG

## 2024-08-06 DIAGNOSIS — Z00.00 ANNUAL PHYSICAL EXAM: Primary | ICD-10-CM

## 2024-08-06 DIAGNOSIS — F90.0 ADHD (ATTENTION DEFICIT HYPERACTIVITY DISORDER), INATTENTIVE TYPE: ICD-10-CM

## 2024-08-06 DIAGNOSIS — F41.9 ANXIETY: ICD-10-CM

## 2024-08-06 PROCEDURE — 99203 OFFICE O/P NEW LOW 30 MIN: CPT | Performed by: FAMILY MEDICINE

## 2024-08-06 PROCEDURE — 99385 PREV VISIT NEW AGE 18-39: CPT | Performed by: FAMILY MEDICINE

## 2024-08-06 RX ORDER — FLUOXETINE HYDROCHLORIDE 20 MG/1
20 CAPSULE ORAL DAILY
Qty: 30 CAPSULE | Refills: 5 | Status: SHIPPED | OUTPATIENT
Start: 2024-08-06

## 2024-08-06 NOTE — PROGRESS NOTES
Adult Annual Physical  Name: Jeannie Carr      : 1990      MRN: [id number]  Encounter Provider: Rohit Chiu MD  Encounter Date: 2024   Encounter department: [facility]    Assessment & Plan   1. Annual physical exam  2. Anxiety  Assessment & Plan:  Restart fluoxetine 20mg qd. I reviewed side effects of meds. F/u with psychiatry  Orders:  -     FLUoxetine (PROzac) 20 mg capsule; Take 1 capsule (20 mg total) by mouth daily  3. ADHD (attention deficit hyperactivity disorder), inattentive type  Assessment & Plan:  I reviewed with pt. Stable on present care. Continue Vyvanse. F/u with psychiatry  Immunizations and preventive care screenings were discussed with patient today. Appropriate education was printed on patient's after visit summary.    Counseling:  Exercise: the importance of regular exercise/physical activity was discussed. Recommend exercise 3-5 times per week for at least 30 minutes.          History of Present Illness     Adult Annual Physical:  Patient presents for annual physical.   - pt just moved back to the area.   - pt with hx of ADHD, treated successfully with Vyvanse 30mg qd. Pt was off med while pregnant, and found that she as having issues with focus. Just restarted med  - pt also with hx of anxiety, treated with fluoxetine. Pt has been off of med for over a month. She is scheduled to see psychiatry on  but would like a refill prior to that visit. She states that her mood is a little labile at present, but denies severe symptoms. Pt denies depressed mood  .     Diet and Physical Activity:  - Diet/Nutrition: well balanced diet.  - Exercise: no formal exercise.    Depression Screening:  - PHQ-2 Score: 1    General Health:  - Sleep:. labile - due to children  - Hearing: normal hearing bilateral ears.  - Vision: wears contacts, wears glasses and most recent eye exam < 1 year ago.  - Dental: regular dental visits and brushes teeth twice  daily.    /GYN Health:  - Follows with GYN: no.   - Menopause: premenopausal.   - Contraception:. nothing      Advanced Care Planning:  - Has an advanced directive?: no    - Has a durable medical POA?: no    - ACP document given to patient?: no      Review of Systems   Constitutional:  Positive for fatigue. Negative for activity change, appetite change, chills and fever.   HENT: Negative.     Eyes: Negative.    Respiratory: Negative.     Cardiovascular: Negative.    Gastrointestinal: Negative.    Endocrine: Negative.    Genitourinary: Negative.    Musculoskeletal: Negative.    Skin: Negative.    Allergic/Immunologic: Negative.    Neurological: Negative.    Hematological: Negative.    Psychiatric/Behavioral:  Positive for decreased concentration (improved since restarting Vyvanse) and dysphoric mood (occasional). Negative for sleep disturbance and suicidal ideas.      Past Medical History   Past Medical History:   Diagnosis Date   • Abnormal Pap smear of cervix 01/2014    2019 Lsil  2023 wnl   • GERD (gastroesophageal reflux disease)    • Herpes     hx of- one outbreak 2014- none since   • Perineal pain in female 06/2018   • Varicella     had vaccines     Past Surgical History:   Procedure Laterality Date   • TOOTH EXTRACTION       Family History   Problem Relation Age of Onset   • Hypertension Mother    • Malig Hypertension Mother    • Anxiety disorder Mother    • Hypertension Father    • No Known Problems Sister    • Substance Abuse Brother         Prefer not to share name/details.   • No Known Problems Son    • Hypertension Maternal Grandmother    • Stroke Maternal Grandmother    • Malig Hypertension Maternal Grandmother    • Hearing loss Maternal Grandmother    • Heart attack Maternal Grandfather    • Hypertension Maternal Grandfather         No longer living.   • No Known Problems Paternal Grandmother    • Stroke Paternal Grandfather         No longer living.   • Other Family         Cardiac Disorder   •  Stroke Family    • Hypertension Family    • Alcohol abuse Maternal Uncle         Several maternal relatives.     Current Outpatient Medications on File Prior to Visit   Medication Sig Dispense Refill   • Lactobacillus (PROBIOTIC ACIDOPHILUS PO) Take by mouth     • lisdexamfetamine (Vyvanse) 30 MG capsule Take 1 capsule (30 mg total) by mouth every morning Max Daily Amount: 30 mg 30 capsule 0   • mometasone (ELOCON) 0.1 % cream APPLY TOPICALLY TO THE AFFECTED AREAS EVERY DAY     • omeprazole (PriLOSEC) 20 mg delayed release capsule Take 20 mg by mouth daily     • ondansetron (ZOFRAN-ODT) 4 mg disintegrating tablet Take 1 tablet (4 mg total) by mouth every 6 (six) hours as needed for nausea or vomiting for up to 20 doses 20 tablet 0     No current facility-administered medications on file prior to visit.   No Known Allergies   Current Outpatient Medications on File Prior to Visit   Medication Sig Dispense Refill   • Lactobacillus (PROBIOTIC ACIDOPHILUS PO) Take by mouth     • lisdexamfetamine (Vyvanse) 30 MG capsule Take 1 capsule (30 mg total) by mouth every morning Max Daily Amount: 30 mg 30 capsule 0   • mometasone (ELOCON) 0.1 % cream APPLY TOPICALLY TO THE AFFECTED AREAS EVERY DAY     • omeprazole (PriLOSEC) 20 mg delayed release capsule Take 20 mg by mouth daily     • ondansetron (ZOFRAN-ODT) 4 mg disintegrating tablet Take 1 tablet (4 mg total) by mouth every 6 (six) hours as needed for nausea or vomiting for up to 20 doses 20 tablet 0     No current facility-administered medications on file prior to visit.      Social History     Tobacco Use   • Smoking status: Never   • Smokeless tobacco: Never   Vaping Use   • Vaping status: Never Used   Substance and Sexual Activity   • Alcohol use: Yes     Alcohol/week: 2.0 standard drinks of alcohol     Types: 2 Standard drinks or equivalent per week     Comment: Very rare - social.   • Drug use: Yes     Types: Marijuana     Comment: pre-children, marijuana, denies  ", pt's brother- susbstance abuse issues, PGF- ETOH abuse   • Sexual activity: Yes     Partners: Male     Birth control/protection: None     Comment: Dariel       Objective     /78   Pulse 84   Ht 5' 3.75\" (1.619 m)   Wt 60.8 kg (134 lb)   LMP 07/12/2024 (Exact Date)   SpO2 98%   BMI 23.18 kg/m²     Physical Exam  Vitals reviewed.   Constitutional:       Appearance: She is well-developed.   HENT:      Head: Normocephalic and atraumatic.      Right Ear: Tympanic membrane, ear canal and external ear normal.      Left Ear: Tympanic membrane, ear canal and external ear normal.      Nose: Nose normal.      Mouth/Throat:      Mouth: Mucous membranes are moist.   Eyes:      Extraocular Movements: Extraocular movements intact.      Conjunctiva/sclera: Conjunctivae normal.      Pupils: Pupils are equal, round, and reactive to light.   Neck:      Thyroid: No thyromegaly.      Vascular: No JVD.   Cardiovascular:      Rate and Rhythm: Normal rate and regular rhythm.      Pulses: Normal pulses.      Heart sounds: Normal heart sounds. No murmur heard.  Pulmonary:      Effort: Pulmonary effort is normal.      Breath sounds: Normal breath sounds. No wheezing.   Abdominal:      General: Bowel sounds are normal. There is no distension.      Palpations: Abdomen is soft. There is no mass.      Tenderness: There is no abdominal tenderness.   Musculoskeletal:         General: No swelling, tenderness or deformity. Normal range of motion.      Cervical back: Normal range of motion and neck supple. No tenderness. No muscular tenderness.      Right lower leg: No edema.      Left lower leg: No edema.   Lymphadenopathy:      Cervical: No cervical adenopathy.   Skin:     General: Skin is warm.      Capillary Refill: Capillary refill takes less than 2 seconds.   Neurological:      General: No focal deficit present.      Mental Status: She is alert and oriented to person, place, and time.      Cranial Nerves: No cranial nerve " deficit.      Sensory: No sensory deficit.      Motor: No weakness or abnormal muscle tone.      Coordination: Coordination normal.      Gait: Gait normal.      Deep Tendon Reflexes: Reflexes normal.   Psychiatric:         Mood and Affect: Mood normal.         Behavior: Behavior normal.         Thought Content: Thought content normal.         Judgment: Judgment normal.      Comments: PHQ-2/9 Depression Screening    Little interest or pleasure in doing things: 1 - several days  Feeling down, depressed, or hopeless: 0 - not at all  PHQ-2 Score: 1  PHQ-2 Interpretation: Negative depression screen

## 2024-08-07 PROBLEM — F41.9 ANXIETY: Status: ACTIVE | Noted: 2024-08-07

## 2024-08-07 PROBLEM — F90.0 ADHD (ATTENTION DEFICIT HYPERACTIVITY DISORDER), INATTENTIVE TYPE: Status: ACTIVE | Noted: 2024-08-07

## 2024-08-22 DIAGNOSIS — F90.0 ADHD, PREDOMINANTLY INATTENTIVE TYPE: ICD-10-CM

## 2024-08-22 DIAGNOSIS — K21.9 GASTROESOPHAGEAL REFLUX DISEASE WITHOUT ESOPHAGITIS: Primary | ICD-10-CM

## 2024-08-22 DIAGNOSIS — K21.9 GASTROESOPHAGEAL REFLUX DISEASE WITHOUT ESOPHAGITIS: ICD-10-CM

## 2024-08-22 RX ORDER — LISDEXAMFETAMINE DIMESYLATE 30 MG/1
30 CAPSULE ORAL EVERY MORNING
Qty: 30 CAPSULE | Refills: 0 | Status: SHIPPED | OUTPATIENT
Start: 2024-08-22

## 2024-08-22 NOTE — TELEPHONE ENCOUNTER
Refill must be reviewed and completed by the office or provider. The refill is unable to be approved or denied by the medication management team.     Cannot be delegated

## 2024-08-22 NOTE — TELEPHONE ENCOUNTER
1 [id number] 07/25/2024 07/25/2024 Vyvanse (Capsule) 30.0 30 30 MG NA CANDICE PATTON POGODZINSKI [facility] Commercial Insurance 0 / 0 [place]

## 2024-08-22 NOTE — TELEPHONE ENCOUNTER
Please note change in pharmacy.       Medication: lisdexamfetamine (Vyvanse) 30 MG capsule     Dose/Frequency:  Take 1 capsule (30 mg total) by mouth every morning Max Daily Amount: 30 mg,     Quantity: 30    Pharmacy: [facility] [address] Address is [address] [place] [id number]    Office:   [x] PCP/Provider -   [] Speciality/Provider -     Does the patient have enough for 3 days?   [x] Yes   [] No - Send as HP to POD    Medication: omeprazole (PriLOSEC) 20 mg delayed release capsule     Dose/Frequency: Take 20 mg by mouth daily    Quantity: 30    Pharmacy: [facility] on [address]? Address is [address] [place] [id number]. Thank you!     Office:   [x] PCP/Provider -   [] Speciality/Provider -     Does the patient have enough for 3 days?   [x] Yes   [] No - Send as HP to POD

## 2024-08-24 PROBLEM — Z01.419 ENCOUNTER FOR GYNECOLOGICAL EXAMINATION (GENERAL) (ROUTINE) WITHOUT ABNORMAL FINDINGS: Status: RESOLVED | Noted: 2024-07-25 | Resolved: 2024-08-24

## 2024-08-27 ENCOUNTER — APPOINTMENT (OUTPATIENT)
Dept: LAB | Facility: CLINIC | Age: 34
End: 2024-08-27
Payer: COMMERCIAL

## 2024-08-27 DIAGNOSIS — R94.31 NONSPECIFIC ABNORMAL ELECTROCARDIOGRAM (ECG) (EKG): ICD-10-CM

## 2024-08-27 DIAGNOSIS — Z79.899 ENCOUNTER FOR LONG-TERM (CURRENT) USE OF OTHER MEDICATIONS: ICD-10-CM

## 2024-08-27 LAB
ATRIAL RATE: 69 BPM
P AXIS: 78 DEGREES
PR INTERVAL: 156 MS
QRS AXIS: 4 DEGREES
QRSD INTERVAL: 76 MS
QT INTERVAL: 412 MS
QTC INTERVAL: 441 MS
T WAVE AXIS: 49 DEGREES
VENTRICULAR RATE: 69 BPM

## 2024-08-27 PROCEDURE — 93010 ELECTROCARDIOGRAM REPORT: CPT | Performed by: INTERNAL MEDICINE

## 2024-08-27 PROCEDURE — 80307 DRUG TEST PRSMV CHEM ANLYZR: CPT

## 2024-08-27 PROCEDURE — 80324 DRUG SCREEN AMPHETAMINES 1/2: CPT

## 2024-08-27 PROCEDURE — 80349 CANNABINOIDS NATURAL: CPT

## 2024-08-27 PROCEDURE — 80359 METHYLENEDIOXYAMPHETAMINES: CPT

## 2024-08-29 DIAGNOSIS — F41.9 ANXIETY: ICD-10-CM

## 2024-09-03 LAB
6MAM UR QL SCN: NEGATIVE NG/ML
ACCEPTABLE CREAT UR QL: 170 MG/DL
AMPHET UR QL CFM: 201 NG/MG CREAT
AMPHET UR QL SCN: ABNORMAL NG/ML
BARBITURATES UR QL SCN: NEGATIVE NG/ML
BENZODIAZ UR QL SCN: NEGATIVE NG/ML
BUPRENORPHINE UR QL CFM: NEGATIVE NG/ML
CANNABINOIDS UR QL SCN: ABNORMAL NG/ML
CANNABINOIDS/CREAT UR: 11 NG/MG CREAT
CARISOPRODOL UR QL: NEGATIVE NG/ML
COCAINE+BZE UR QL SCN: NEGATIVE NG/ML
ETHYL GLUCURONIDE UR QL SCN: NEGATIVE NG/ML
FENTANYL UR QL SCN: NEGATIVE NG/ML
GABAPENTIN SERPLBLD QL SCN: NEGATIVE UG/ML
MDMA UR QL CFM: NOT DETECTED NG/MG CREAT
MDMA UR QL CFM: NOT DETECTED NG/MG CREAT
METHADONE UR QL SCN: NEGATIVE NG/ML
METHAMPHET UR QL CFM: NOT DETECTED NG/MG CREAT
NITRITE UR QL STRIP: NEGATIVE UG/ML
OPIATES UR QL SCN: NEGATIVE NG/ML
OXYCODONE+OXYMORPHONE UR QL SCN: NEGATIVE NG/ML
PCP UR QL SCN: NEGATIVE NG/ML
PROPOXYPH UR QL SCN: NEGATIVE NG/ML
SPECIMEN PH ACCEPTABLE UR: 8.5 (ref 4.5–8.9)
TAPENTADOL UR QL SCN: NEGATIVE NG/ML
TRAMADOL UR QL SCN: NEGATIVE NG/ML

## 2024-09-14 DIAGNOSIS — K21.9 GASTROESOPHAGEAL REFLUX DISEASE WITHOUT ESOPHAGITIS: ICD-10-CM

## 2024-10-04 ENCOUNTER — TELEPHONE (OUTPATIENT)
Age: 34
End: 2024-10-04

## 2024-11-14 NOTE — TELEPHONE ENCOUNTER
Hospitalist Progress Note  11/14/2024 5:25 AM  Subjective:   Admit Date: 11/12/2024  PCP: Bryson Ardon MD    Interval History: Virginia has no complaints this morning.  She is feeling better after receiving a unit of blood yesterday.  No chest pain or SOB. I reviewed the CT findings with her and she wants me to discuss with her son Deandre.  Appetite is \"okay\".      Diet: ADULT DIET; Regular  ADULT ORAL NUTRITION SUPPLEMENT; Breakfast, Lunch, Dinner; Standard High Calorie/High Protein Oral Supplement  Medications:   Scheduled Meds:   magnesium oxide  400 mg Oral Daily    sodium bicarbonate  650 mg Oral BID    sucralfate  1 g Oral 3 times per day    escitalopram  10 mg Oral Daily    [Held by provider] aspirin  81 mg Oral BID    atorvastatin  10 mg Oral Daily    Vitamin D  2,000 Units Oral Daily    lactobacillus  1 capsule Oral Daily with breakfast    pantoprazole  40 mg Oral QAM AC    sodium chloride flush  5-40 mL IntraVENous 2 times per day    cefTRIAXone (ROCEPHIN) IV  1,000 mg IntraVENous Q24H     Continuous Infusions:   sodium chloride      sodium chloride         Patient's current medications documented, reviewed, and updated.      CBC:   Recent Labs     11/12/24 1915 11/13/24 0345 11/14/24  0408   WBC 11.9* 9.4 10.6   HGB 7.7* 6.5* 8.0*    262 256     BMP:    Recent Labs     11/12/24 1915 11/13/24 0345 11/14/24  0408   * 136 136   K 3.5* 3.3* 3.7   CL 97* 101 102   CO2 26 28 26   BUN 23 21 22   CREATININE 2.1* 2.0* 2.0*   GLUCOSE 153* 100* 107*     Hepatic:   Recent Labs     11/12/24 1915   AST 10   ALT 6   BILITOT 0.4   ALKPHOS 101     Troponin: No results for input(s): \"TROPONINI\" in the last 72 hours.  BNP: No results for input(s): \"BNP\" in the last 72 hours.  Lipids: No results for input(s): \"CHOL\", \"HDL\" in the last 72 hours.    Invalid input(s): \"LDLCALCU\"  INR: No results for input(s): \"INR\" in the last 72 hours.      Objective:   Vitals: BP (!) 115/53   Pulse 82   Temp 99 °F (37.2 °C)  Pt called to sched postpartum appt. She would be due to be seen on 3/23/24 which is a Sunday so looked for that Monday and first available is not until the following week on 4/4/24. Pt wanted to be sure it is ok to come in a week later if not please call to resched for a sooner appt. Also pt wanted to know how she can get the certificate the hospital gives out with the baby's foot prints because she was never given one and needs one for insurance purposes.

## 2025-01-15 ENCOUNTER — ULTRASOUND (OUTPATIENT)
Dept: OBGYN CLINIC | Facility: CLINIC | Age: 35
End: 2025-01-15
Payer: COMMERCIAL

## 2025-01-15 VITALS — BODY MASS INDEX: 22.63 KG/M2 | WEIGHT: 130.8 LBS | SYSTOLIC BLOOD PRESSURE: 120 MMHG | DIASTOLIC BLOOD PRESSURE: 76 MMHG

## 2025-01-15 DIAGNOSIS — N91.1 SECONDARY AMENORRHEA: Primary | ICD-10-CM

## 2025-01-15 PROCEDURE — 76817 TRANSVAGINAL US OBSTETRIC: CPT | Performed by: PHYSICIAN ASSISTANT

## 2025-01-15 PROCEDURE — 99213 OFFICE O/P EST LOW 20 MIN: CPT | Performed by: PHYSICIAN ASSISTANT

## 2025-01-15 RX ORDER — CALCIUM CARBONATE 500 MG/1
1 TABLET, CHEWABLE ORAL AS NEEDED
COMMUNITY

## 2025-01-15 NOTE — PROGRESS NOTES
Early ultrasound   LMP: 2024  SAMANTHA: 2025  GA: 9w6d  Patient reports nausea and vomiting. Taking tums, zofran, and more chews for symptoms. Also reports mild cramping  Denies any bleeding, Leaking of fluid.    Planned pregnancy   Regular menses. Cycles are 28 days and periods last 3-5 days.     Patient is accompanied by  (Dariel)    Ultrasound Probe Disinfection    A transvaginal ultrasound was performed.   Prior to use, disinfection was performed with High Level Disinfection Process ([employer]).  Probe serial number.Probe serial number: [id number]

## 2025-01-15 NOTE — PROGRESS NOTES
ASSESSMENT/PLAN    Early pregnancy at 9w6d with a calculated SAMANTHA of 2025 based on LMP.     - Genetic screening options reviewed. She is interested in NIPT, so MFM referral placed  - Prenatal care reviewed  - Pregnancy precautions reviewed  - Prenatal Vitamin recommended.       RTO for OB interview and PN-1 visit    SUBJECTIVE:    Jeannie Carr is a 34 y.o.  presenting today for verification of pregnancy.     Patient's last menstrual period was 2024 (exact date).    Menses are regular.  This pregnancy was planned    She is accompanied by , Dariel .     Reports nausea, vomiting,   Denies bleeding, cramping       OB hx significant for:   #1 - 2021 -    #2 3/2/24      Taking a prenatal vitamin.     The following portions of the patient's history were reviewed and updated as appropriate: allergies, current medications, past family history, past medical history, past social history, past surgical history, and problem list.    OBJECTIVE:    /76 (BP Location: Left arm, Patient Position: Sitting, Cuff Size: Standard)   Wt 59.3 kg (130 lb 12.8 oz)   LMP 2024 (Exact Date)   BMI 22.63 kg/m²     FINDINGS:  See imaging report for details    Single intrauterine pregnancy of 9w0d gestational age.     (Some difficulty obtaining measurements due to fetal positioning. Most accurate CRL 2.48cm.)    Additional Findings: none     FHR: 175    Ultrasound Probe Disinfection    A transvaginal ultrasound was performed.   Prior to use, disinfection was performed with High Level Disinfection Process ([employer])  Probe serial number [id number]:  [id number] was used    Cary Toscano PA-C  01/15/25  3:10 PM

## 2025-01-16 ENCOUNTER — TELEPHONE (OUTPATIENT)
Age: 35
End: 2025-01-16

## 2025-01-16 NOTE — TELEPHONE ENCOUNTER
Contacted patient off of Medication Management  wait list to verify needs of services in attempts to offer appt. spoke with patient whom stated she is not interested in services anymore.     Attempt #1  Pt removed from wait list.

## 2025-01-28 ENCOUNTER — DOCUMENTATION (OUTPATIENT)
Dept: PERINATAL CARE | Facility: OTHER | Age: 35
End: 2025-01-28

## 2025-01-28 NOTE — PROGRESS NOTES
Received warm transfer from Patient access center. Jeannie had some questions regarding genetic testing that we offer at [facility]. Reviewed with Jeannie that after her Ultrasound Nuchal appt the physician will review genetic testing options. Jeannie had question relating to insurance purposes. I did inform Jeannie that with her insurance [facility] we will need a prior authorization prior to her being able to do any NIPS unless she wants to opt for the self-pay option with [facility] [id number] NIPT of $299.00.   Jeannie understood the above and has been in contact with her insurance company. Confirmed her NT appt with [facility]. Patient had no further questions at this time and we look forward to seeing her at her 2/10/25 appt in our [place] location.

## 2025-01-31 ENCOUNTER — INITIAL PRENATAL (OUTPATIENT)
Dept: OBGYN CLINIC | Facility: CLINIC | Age: 35
End: 2025-01-31

## 2025-01-31 DIAGNOSIS — Z34.81 PRENATAL CARE, SUBSEQUENT PREGNANCY, FIRST TRIMESTER: Primary | ICD-10-CM

## 2025-01-31 PROCEDURE — OBC

## 2025-01-31 RX ORDER — LANOLIN ALCOHOL/MO/W.PET/CERES
50 CREAM (GRAM) TOPICAL DAILY
COMMUNITY

## 2025-01-31 NOTE — PROGRESS NOTES
OB INTAKE INTERVIEW  Patient is 34 y.o.y.o. who presents for OB intake at 12-1 wks  She is accompanied by phone during this encounter  The father of her baby (Dariel) is involved in the pregnancy and they are .        Last Menstrual Period: 24  Ultrasound: Measured 9 weeks 0 days on 1/15/25  Estimated Date of Delivery: 25 via LMP, confirmed by US     Signs/Symptoms of Pregnancy  Current pregnancy symptoms: nausea- B6  Constipation YES- colace  Headaches YES- a few  Cramping/spotting no  PICA cravings no    Diabetes-    If patient has 1 or more, please order early 1 hour GTT  History of GDM no  BMI >35 no  History of PCOS or current metformin use no  History of LGA/macrosomic infant (4000g/9lbs) no    If patient has 2 or more, please order early 1 hour GTT  BMI>30 no  AMA no  First degree relative with type 2 diabetes no  History of chronic HTN, hyperlipidemia, elevated A1C no  High risk race (, , ,  or ) no    Hypertension- if you answer yes to any of the following, please order baseline preeclampsia labs (cbc, comprehensive metabolic panel, urine protein creatinine ratio, uric acid)  History of of chronic HTN no  History of gestational HTN no  History of preeclampsia, eclampsia, or HELLP syndrome no  History of diabetes no  History of lupus, autoimmune disease, kidney disease no    Thyroid- if yes order TSH with reflex T4  History of thyroid disease no    Bleeding Disorder or Hx of DVT-patient or first degree relative with history of. Order the following if not done previously.   (Factor V, antithrombin III, prothrombin gene mutation, protein C and S Ag, lupus anticoagulant, anticardiolipin, beta-2 glycoprotein)   no    OB/GYN-  History of abnormal pap smear YES       Date of last pap smear 2024 wnl  History of HPV no  History of Herpes/HSV YES x 1 outbreak  History of other STI (gonorrhea, chlamydia, trich) YES- chlamydia,  tx'd  History of prior  YES  History of prior  no  History of  delivery prior to 36 weeks 6 days YES- PPROM  History of blood transfusion no  Ok for blood transfusion yes    Substance screening- if yes outside of tobacco for her or anyone in her home-order urine drug screen  History of tobacco use no  Currently using tobacco no  Currently using alcohol no  Presently using drugs no  Past drug use  no  IV drug use- no  Partner drug use no  Parent/Family drug use YES- pt's brother-Hx of drug use    MRSA Screening-   Does the pt have a hx of MRSA? no    Immunizations:  Influenza vaccine given this season no- discussed importance of receiving  Discussed Tdap vaccine yes  Discussed COVID Vaccine yes    Genetic/MFM-  Do you or your partner have a history of any of the following in yourselves or first degree relatives?  Cystic fibrosis no  Spinal muscular atrophy no  Hemoglobinopathy/Sickle Cell/Thalassemia no  Fragile X Intellectual Disability no    If yes, discuss Carrier Screening and recommend consultation with MFM/Genetic Counseling and place specific [facility] Referral for.    If no, discuss Carrier Screening being completed once in a lifetime as a standard of care lab test. Place orders for Cystic Fibrosis Gene Test (ZOM890) and Spinal Muscular Atrophy DNA ([id number])      Appointment for Nuchal Translucency Ultrasound at [facility] scheduled for 2/10/25    Interview education  [facility] Pregnancy Essentials Book reviewed, discussed and attached to their AVS yes    Nurse/Family Partnership- patient may qualify no; referral placed no    Prenatal lab work scripts yes  Extra labs ordered:  no    Aspirin/Preeclampsia Screen    Risk Level Risk Factor Recommendation   LOW Prior Uncomplicated full-term delivery YES No Aspirin recommendation        MODERATE Nulliparity no Recommend low-dose aspirin if     BMI>30 no 2 or more moderate risk factors    Family History Preeclampsia (mother/sister) no     35yr old or greater  no      or Low Socioeconomic no     IVF Pregnancy  no     Personal History Risks (low birth weight, prior adverse preg outcome, >10yr preg interval) no         HIGH History of Preeclampsia no Recommend low-dose aspirin if     Multifetal gestation no 1 or more high risk factors    Chronic HTN no     Type 1 or 2 Diabetes no     Renal Disease no     Autoimmune Disease  no      Contraindications to ASA therapy:  NSAID/ ASA allergy: no  Nasal polyps: no  Asthma with history of ASA induced bronchospasm: no  Relative contraindications:  History of GI bleed: no  Active peptic ulcer disease: no  Severe hepatic dysfunction: no    Patient should be recommended to take ASA 162mg during this pregnancy from 12-36wks to lower her risk of preeclampsia: no      The patient has a history now or in prior pregnancy notable for:    Hx of 2 prior vag mimi, 1  36.5 wks.-PPROM.  Hx of Anxiety/depression- takes Prozac 20 mg daily,  Hx of GERD/IBS,  BMI 22.6,  AB+,  Hx of herpes- last & only outbreak-.        Details that I feel the provider should be aware of: Pt declined CF & SMA,  Pt Declined EPDS- (states previously she was billed every time she answered the survey)  .      PN1 visit scheduled. The patient was oriented to our practice, reviewed delivering physicians and [facility] for Delivery. All questions were answered.  PN phone interview completed.  Pt & /FOB, Dariel , happy with pregnancy.  PN bldwk ordered in epic.  Pt has appts for PN1, 25 & PNC 2/10/25.  Reviewed contents of blue folder & Nurse Navigator role.  SDOH completed.  Pt declined EPDS (see above note).  Advised pt to call with any further questions/concerns.       Interviewed by: Cindy Covarrubias RN

## 2025-02-05 PROBLEM — Z30.09 ENCOUNTER FOR COUNSELING REGARDING CONTRACEPTION: Status: RESOLVED | Noted: 2024-04-04 | Resolved: 2025-02-05

## 2025-02-05 PROBLEM — O09.899 HISTORY OF PRETERM DELIVERY, CURRENTLY PREGNANT: Status: ACTIVE | Noted: 2025-02-05

## 2025-02-05 PROBLEM — O99.340 ANXIETY DISORDER AFFECTING PREGNANCY, ANTEPARTUM: Status: ACTIVE | Noted: 2025-02-05

## 2025-02-05 PROBLEM — Z3A.12 12 WEEKS GESTATION OF PREGNANCY: Status: ACTIVE | Noted: 2025-02-05

## 2025-02-05 PROBLEM — F41.9 ANXIETY DISORDER AFFECTING PREGNANCY, ANTEPARTUM: Status: ACTIVE | Noted: 2025-02-05

## 2025-02-05 NOTE — ASSESSMENT & PLAN NOTE
Patient is a 34 y.o.  at 13w1d presenting for initial prenatal visit.  -Patient is doing well today denies N/V, VB, cramping  -Gonorrhea and Chlamydia cultures obtained today  -Pap smear with HPV co-testing up to date   -Prenatal labs reviewed - active, encouraged patient to complete   -Genetic screening scheduled for 2/10/25  -Continue PNV  -AFP reviewed, will order at next visit  -Plans to breast feed   -Flu declined   -Reviewed the reasons to call - namely vaginal bleeding, severe nausea and vomiting, severe pelvic pain, fevers or pain/burning with urination.    -Discussed as well during this visit was diet, prenatal vitamins, prenatal visits, lab testing, breast feeding, vaccinations, maternal fetal medicine consultations, and lifestyle.  -RTO in 4 weeks for routine PN visit    Blue packet given and reviewed

## 2025-02-05 NOTE — PROGRESS NOTES
Name: Jeannie Carr      : 1990      MRN: [id number]  Encounter Provider: Chantal Del Cid PA-C  Encounter Date: 2025   Encounter department: [facility] [place]  :  Assessment & Plan  12 weeks gestation of pregnancy  Patient is a 34 y.o.  at 13w1d presenting for initial prenatal visit.  -Patient is doing well today denies N/V, VB, cramping  -Gonorrhea and Chlamydia cultures obtained today  -Pap smear with HPV co-testing up to date   -Prenatal labs reviewed - active, encouraged patient to complete   -Genetic screening scheduled for 2/10/25  -Continue PNV  -AFP reviewed, will order at next visit  -Plans to breast feed   -Flu declined   -Reviewed the reasons to call - namely vaginal bleeding, severe nausea and vomiting, severe pelvic pain, fevers or pain/burning with urination.    -Discussed as well during this visit was diet, prenatal vitamins, prenatal visits, lab testing, breast feeding, vaccinations, maternal fetal medicine consultations, and lifestyle.  -RTO in 4 weeks for routine PN visit    Blue packet given and reviewed        HSV infection  -Hx of HSV outbreak in , has not had outbreak since   -Recommended valtrex suppression at 36 weeks        History of  delivery, currently pregnant  - vaginal delivery at 36.5 weeks due to PPROM   - labor precautions reviewed        Anxiety disorder affecting pregnancy, antepartum  -Patient with Hx of anxiety and depression   -Takes Prozac 20 mg daily   -Patient feels she is struggling and feels like her anxiety has worsened since she has not been able to take her ADHD medications. Denies SI/HI.  -Recommended to increase dose of Prozac to 40 mg daily. Patient has enough supply to take two 20 mg tablets daily. She does not need a new script at this time.   -Resources discussed, patient is in therapy currently. Advised to reach out if she still has trouble coping         Gastroesophageal reflux  disease, unspecified whether esophagitis present  -Patient is controlled, takes omeprazole daily        Short interval between pregnancies affecting pregnancy, antepartum  MFM appointment 2/10       Prenatal care, subsequent pregnancy, second trimester    Orders:    POCT urine dip    Chlamydia/GC amplified DNA by PCR    Screening for STD (sexually transmitted disease)    Orders:    Chlamydia/GC amplified DNA by PCR      History of Present Illness   HPI  34 y.o.  at 13w1d with Estimated Date of Delivery: 25 by LMP 2024 consistent w/ 1st trimester US.     She denies nausea/vomiting and bleeding/cramping.     Prenatal labs: active     Genetic screening: Scheduled with MFM 2/10/25 for sequential screen.     OB history:  vaginal delivery , vaginal delivery     GYN history: Last pap smear 2024 NILM/neg. History of STDs: History of HSV and chlamydia     Past medical history:   Past Medical History:   Diagnosis Date    Abnormal Pap smear of cervix 2014 Lsil   wnl    Anxiety     fluoxatine 20 mg daily    Chlamydia     - chlamydia   tx'd    Depression     Hx of    Encounter for counseling regarding contraception 2024    GERD (gastroesophageal reflux disease)     Herpes     hx of- one outbreak - none since    IBS (irritable bowel syndrome)     Perineal pain in female 2018     (spontaneous vaginal delivery) 2024    Varicella     had vaccines       Past surgical history:   Past Surgical History:   Procedure Laterality Date    TOOTH EXTRACTION       Social history: Denies tobacco, alcohol, or illicit drug use.    Family history:   DVT/PE: Declined   Cancer: breast cancer in sister   Heart disease: Maternal grandmother, Maternal grandfather   Stroke: Maternal grandmother, Paternal grandfather    Review of Systems   Constitutional: Negative.    Cardiovascular:  Negative for leg swelling.   Gastrointestinal:  Negative for abdominal pain, nausea and  vomiting.   Genitourinary:  Negative for dysuria, frequency, hematuria, pelvic pain, urgency, vaginal bleeding, vaginal discharge and vaginal pain.   Neurological:  Negative for headaches.   Psychiatric/Behavioral:  Negative for dysphoric mood, self-injury and suicidal ideas. The patient is not nervous/anxious.        Current Outpatient Medications on File Prior to Visit   Medication Sig Dispense Refill    FLUoxetine (PROzac) 20 mg capsule TAKE 1 CAPSULE BY MOUTH EVERY DAY 90 capsule 1    Lactobacillus (PROBIOTIC ACIDOPHILUS PO) Take by mouth      omeprazole (PriLOSEC) 20 mg delayed release capsule TAKE 1 CAPSULE BY MOUTH EVERY DAY 90 capsule 1    Prenatal Vit-Fe Fumarate-FA (PRENATAL PO) Take 1 capsule by mouth in the morning      pyridoxine (VITAMIN B6) 50 mg tablet Take 50 mg by mouth daily      calcium carbonate (TUMS) 500 mg chewable tablet Chew 1 tablet if needed for indigestion or heartburn (Patient not taking: Reported on 1/31/2025)      lisdexamfetamine (Vyvanse) 30 MG capsule Take 1 capsule (30 mg total) by mouth every morning Max Daily Amount: 30 mg (Patient not taking: Reported on 1/15/2025) 30 capsule 0    mometasone (ELOCON) 0.1 % cream APPLY TOPICALLY TO THE AFFECTED AREAS EVERY DAY (Patient not taking: Reported on 1/15/2025)      ondansetron (ZOFRAN-ODT) 4 mg disintegrating tablet Take 1 tablet (4 mg total) by mouth every 6 (six) hours as needed for nausea or vomiting for up to 20 doses (Patient not taking: Reported on 1/31/2025) 20 tablet 0     No current facility-administered medications on file prior to visit.      Social History     Tobacco Use    Smoking status: Never    Smokeless tobacco: Never   Vaping Use    Vaping status: Never Used   Substance and Sexual Activity    Alcohol use: Yes     Alcohol/week: 2.0 standard drinks of alcohol     Types: 2 Standard drinks or equivalent per week     Comment: Very rare - social. none with pregnancy    Drug use: Yes     Types: Marijuana     Comment:  pre-children, marijuana, denies , pt's brother- susbstance abuse issues, PGF- ETOH abuse    Sexual activity: Yes     Partners: Male     Birth control/protection: None, OCP     Comment: DIONNA iVeiraB/        Objective   /68 (BP Location: Right arm, Patient Position: Sitting, Cuff Size: Standard)   Wt 59.4 kg (131 lb)   LMP 2024 (Exact Date)   BMI 22.66 kg/m²     Pre-Saad Vitals      Flowsheet Row Most Recent Value   Prenatal Assessment    Fetal Heart Rate 150   Prenatal Vitals    Blood Pressure 118/68   Weight - Scale 59.4 kg (131 lb)   Urine Albumin/Glucose    Dilation/Effacement/Station    Vaginal Drainage    Edema           Physical Exam  Constitutional:       General: She is not in acute distress.     Appearance: Normal appearance. She is well-developed and well-groomed. She is not ill-appearing.   Genitourinary:      Bladder, rectum and urethral meatus normal.      No lesions in the vagina.      Right Labia: No rash, tenderness, lesions or skin changes.     Left Labia: No tenderness, lesions, skin changes or rash.     No vaginal discharge, erythema, tenderness or bleeding.      No vaginal prolapse present.     No vaginal atrophy present.       Right Adnexa: not tender, not full and no mass present.     Left Adnexa: not tender, not full and no mass present.     No cervical motion tenderness, discharge, friability, lesion or polyp.      Uterus is not enlarged, fixed, tender or irregular.      No uterine mass detected.     No urethral tenderness or mass present.      Bladder is not tender.    Breasts:     Breasts are symmetrical.      Right: Normal. Present. No swelling, bleeding, inverted nipple, mass, nipple discharge, skin change, tenderness or breast implant.      Left: Normal. Present. No swelling, bleeding, inverted nipple, mass, nipple discharge, skin change, tenderness or breast implant.   HENT:      Head: Normocephalic and atraumatic.   Neck:      Thyroid: No thyroid mass,  thyromegaly or thyroid tenderness.   Pulmonary:      Effort: Pulmonary effort is normal.   Abdominal:      General: Abdomen is flat.   Lymphadenopathy:      Upper Body:      Right upper body: No supraclavicular or axillary adenopathy.      Left upper body: No supraclavicular or axillary adenopathy.   Neurological:      Mental Status: She is alert.   Psychiatric:         Mood and Affect: Mood normal.         Behavior: Behavior normal. Behavior is cooperative.         Thought Content: Thought content normal.         Judgment: Judgment normal.

## 2025-02-05 NOTE — ASSESSMENT & PLAN NOTE
-Patient with Hx of anxiety and depression   -Takes Prozac 20 mg daily   -Patient feels she is struggling and feels like her anxiety has worsened since she has not been able to take her ADHD medications. Denies SI/HI.  -Recommended to increase dose of Prozac to 40 mg daily. Patient has enough supply to take two 20 mg tablets daily. She does not need a new script at this time.   -Resources discussed, patient is in therapy currently. Advised to reach out if she still has trouble coping

## 2025-02-07 ENCOUNTER — APPOINTMENT (OUTPATIENT)
Dept: LAB | Facility: CLINIC | Age: 35
End: 2025-02-07
Payer: COMMERCIAL

## 2025-02-07 ENCOUNTER — INITIAL PRENATAL (OUTPATIENT)
Dept: OBGYN CLINIC | Facility: CLINIC | Age: 35
End: 2025-02-07
Payer: COMMERCIAL

## 2025-02-07 VITALS — SYSTOLIC BLOOD PRESSURE: 118 MMHG | BODY MASS INDEX: 22.66 KG/M2 | DIASTOLIC BLOOD PRESSURE: 68 MMHG | WEIGHT: 131 LBS

## 2025-02-07 DIAGNOSIS — B00.9 HSV INFECTION: ICD-10-CM

## 2025-02-07 DIAGNOSIS — K21.9 GASTROESOPHAGEAL REFLUX DISEASE, UNSPECIFIED WHETHER ESOPHAGITIS PRESENT: ICD-10-CM

## 2025-02-07 DIAGNOSIS — Z34.81 PRENATAL CARE, SUBSEQUENT PREGNANCY, FIRST TRIMESTER: ICD-10-CM

## 2025-02-07 DIAGNOSIS — O99.340 ANXIETY DISORDER AFFECTING PREGNANCY, ANTEPARTUM: ICD-10-CM

## 2025-02-07 DIAGNOSIS — Z34.82 PRENATAL CARE, SUBSEQUENT PREGNANCY, SECOND TRIMESTER: ICD-10-CM

## 2025-02-07 DIAGNOSIS — Z11.3 SCREENING FOR STD (SEXUALLY TRANSMITTED DISEASE): ICD-10-CM

## 2025-02-07 DIAGNOSIS — F41.9 ANXIETY DISORDER AFFECTING PREGNANCY, ANTEPARTUM: ICD-10-CM

## 2025-02-07 DIAGNOSIS — O09.899 HISTORY OF PRETERM DELIVERY, CURRENTLY PREGNANT: ICD-10-CM

## 2025-02-07 DIAGNOSIS — O09.899 SHORT INTERVAL BETWEEN PREGNANCIES AFFECTING PREGNANCY, ANTEPARTUM: ICD-10-CM

## 2025-02-07 DIAGNOSIS — Z3A.12 12 WEEKS GESTATION OF PREGNANCY: Primary | ICD-10-CM

## 2025-02-07 LAB
ABO GROUP BLD: NORMAL
BACTERIA UR QL AUTO: NORMAL /HPF
BASOPHILS # BLD AUTO: 0.02 THOUSANDS/ΜL (ref 0–0.1)
BASOPHILS NFR BLD AUTO: 0 % (ref 0–1)
BILIRUB UR QL STRIP: NEGATIVE
BLD GP AB SCN SERPL QL: NEGATIVE
CLARITY UR: CLEAR
COLOR UR: NORMAL
EOSINOPHIL # BLD AUTO: 0.06 THOUSAND/ΜL (ref 0–0.61)
EOSINOPHIL NFR BLD AUTO: 1 % (ref 0–6)
ERYTHROCYTE [DISTWIDTH] IN BLOOD BY AUTOMATED COUNT: 12.3 % (ref 11.6–15.1)
GLUCOSE UR STRIP-MCNC: NEGATIVE MG/DL
HCT VFR BLD AUTO: 39.4 % (ref 34.8–46.1)
HGB BLD-MCNC: 12.9 G/DL (ref 11.5–15.4)
HGB UR QL STRIP.AUTO: NEGATIVE
IMM GRANULOCYTES # BLD AUTO: 0.05 THOUSAND/UL (ref 0–0.2)
IMM GRANULOCYTES NFR BLD AUTO: 1 % (ref 0–2)
KETONES UR STRIP-MCNC: NEGATIVE MG/DL
LEUKOCYTE ESTERASE UR QL STRIP: NEGATIVE
LYMPHOCYTES # BLD AUTO: 1.78 THOUSANDS/ΜL (ref 0.6–4.47)
LYMPHOCYTES NFR BLD AUTO: 23 % (ref 14–44)
MCH RBC QN AUTO: 30.5 PG (ref 26.8–34.3)
MCHC RBC AUTO-ENTMCNC: 32.7 G/DL (ref 31.4–37.4)
MCV RBC AUTO: 93 FL (ref 82–98)
MONOCYTES # BLD AUTO: 0.29 THOUSAND/ΜL (ref 0.17–1.22)
MONOCYTES NFR BLD AUTO: 4 % (ref 4–12)
NEUTROPHILS # BLD AUTO: 5.7 THOUSANDS/ΜL (ref 1.85–7.62)
NEUTS SEG NFR BLD AUTO: 71 % (ref 43–75)
NITRITE UR QL STRIP: NEGATIVE
NON-SQ EPI CELLS URNS QL MICRO: NORMAL /HPF
NRBC BLD AUTO-RTO: 0 /100 WBCS
PH UR STRIP.AUTO: 6.5 [PH]
PLATELET # BLD AUTO: 198 THOUSANDS/UL (ref 149–390)
PMV BLD AUTO: 10.8 FL (ref 8.9–12.7)
PROT UR STRIP-MCNC: NEGATIVE MG/DL
RBC # BLD AUTO: 4.23 MILLION/UL (ref 3.81–5.12)
RBC #/AREA URNS AUTO: NORMAL /HPF
RH BLD: POSITIVE
RUBV IGG SERPL IA-ACNC: 55.4 IU/ML
SL AMB  POCT GLUCOSE, UA: NEGATIVE
SL AMB POCT URINE PROTEIN: NEGATIVE
SP GR UR STRIP.AUTO: 1.01 (ref 1–1.03)
UROBILINOGEN UR STRIP-ACNC: <2 MG/DL
WBC # BLD AUTO: 7.9 THOUSAND/UL (ref 4.31–10.16)
WBC #/AREA URNS AUTO: NORMAL /HPF

## 2025-02-07 PROCEDURE — 86762 RUBELLA ANTIBODY: CPT

## 2025-02-07 PROCEDURE — PNV

## 2025-02-07 PROCEDURE — 86900 BLOOD TYPING SEROLOGIC ABO: CPT

## 2025-02-07 PROCEDURE — 81001 URINALYSIS AUTO W/SCOPE: CPT

## 2025-02-07 PROCEDURE — 85025 COMPLETE CBC W/AUTO DIFF WBC: CPT

## 2025-02-07 PROCEDURE — 36415 COLL VENOUS BLD VENIPUNCTURE: CPT

## 2025-02-07 PROCEDURE — 86901 BLOOD TYPING SEROLOGIC RH(D): CPT

## 2025-02-07 PROCEDURE — 87591 N.GONORRHOEAE DNA AMP PROB: CPT

## 2025-02-07 PROCEDURE — 86850 RBC ANTIBODY SCREEN: CPT

## 2025-02-07 PROCEDURE — 81002 URINALYSIS NONAUTO W/O SCOPE: CPT

## 2025-02-07 PROCEDURE — 87086 URINE CULTURE/COLONY COUNT: CPT

## 2025-02-07 PROCEDURE — 87491 CHLMYD TRACH DNA AMP PROBE: CPT

## 2025-02-07 NOTE — PROGRESS NOTES
Pt is here for pn1 visit   No concerns at this time  Urine neg/neg   No VB,Cramping  Pn1 Labs not completed   AB +  Last Pap 4/2024 NILM/HPV -  GC/CHL Collected today   Blue Folder given/reviewed   Did not have flu vaccine

## 2025-02-08 LAB
BACTERIA UR CULT: NORMAL
C TRACH DNA SPEC QL NAA+PROBE: NEGATIVE
N GONORRHOEA DNA SPEC QL NAA+PROBE: NEGATIVE

## 2025-02-10 ENCOUNTER — ROUTINE PRENATAL (OUTPATIENT)
Facility: HOSPITAL | Age: 35
End: 2025-02-10
Payer: COMMERCIAL

## 2025-02-10 ENCOUNTER — RESULTS FOLLOW-UP (OUTPATIENT)
Dept: OBGYN CLINIC | Facility: CLINIC | Age: 35
End: 2025-02-10

## 2025-02-10 VITALS
DIASTOLIC BLOOD PRESSURE: 70 MMHG | BODY MASS INDEX: 22.36 KG/M2 | HEIGHT: 64 IN | HEART RATE: 102 BPM | SYSTOLIC BLOOD PRESSURE: 114 MMHG | OXYGEN SATURATION: 100 % | WEIGHT: 131 LBS

## 2025-02-10 DIAGNOSIS — O09.891 HISTORY OF PRETERM DELIVERY, CURRENTLY PREGNANT IN FIRST TRIMESTER: ICD-10-CM

## 2025-02-10 DIAGNOSIS — Z3A.13 13 WEEKS GESTATION OF PREGNANCY: Primary | ICD-10-CM

## 2025-02-10 DIAGNOSIS — Z36.82 NUCHAL TRANSLUCENCY OF FETUS ON PRENATAL ULTRASOUND: ICD-10-CM

## 2025-02-10 DIAGNOSIS — Z36.0 ENCOUNTER FOR ANTENATAL SCREENING FOR CHROMOSOMAL ANOMALIES: ICD-10-CM

## 2025-02-10 PROCEDURE — 36415 COLL VENOUS BLD VENIPUNCTURE: CPT | Performed by: OBSTETRICS & GYNECOLOGY

## 2025-02-10 PROCEDURE — 99214 OFFICE O/P EST MOD 30 MIN: CPT | Performed by: OBSTETRICS & GYNECOLOGY

## 2025-02-10 PROCEDURE — 76813 OB US NUCHAL MEAS 1 GEST: CPT | Performed by: OBSTETRICS & GYNECOLOGY

## 2025-02-10 PROCEDURE — 76801 OB US < 14 WKS SINGLE FETUS: CPT | Performed by: OBSTETRICS & GYNECOLOGY

## 2025-02-10 NOTE — LETTER
February 13, 2025     Cary Toscano PA-C  [address]  [address]  [place] [address]    Patient: Jeannie Carr   YOB: 1990   Date of Visit: 2/10/2025       Dear Ms. Toscano:    Thank you for referring Jeannie Carr to me for evaluation. Below are my notes for this consultation.    If you have questions, please do not hesitate to call me. I look forward to following your patient along with you.         Sincerely,        Raghav Hay MD        CC: No Recipients    Raghav Hay MD  2/13/2025  3:52 PM  Sign when Signing Visit  A fetal ultrasound was completed. See Ob procedures in Epic for an interpretation and recommendations. Do not hesitate to contact us in M if you have questions.    Raghav Hay MD, MSCE  Maternal Fetal Medicine      [facility]  2/10/2025  2:23 PM  Sign when Signing Visit  Patient chose to have [facility] [id number] Non-Invasive Prenatal Screen [id number] PLUS w/ SCA, WITH fetal sex.  Patient choose to be billed through insurance.     Patient given brochure and is aware [facility] will contact patient's insurance and coordinate coverage.  Provided [facility] contact information. General inquiries [phone], Cost estimates [phone] and [facility] Billing [phone]. Website [email or web address].     Blood collection tubes labeled with patient identifiers (name, medical record number, and date of birth).     Filled out [facility] order form. Patient chose to have blood drawn in our office at time of visit. NIPS was drawn from left arm with a butterfly needle by Maria Ines. .      If patient chose to have blood work drawn at a [facility] lab we requested patient notify M (via phone call or [employer] message) when blood collected so office can follow up on results.       Maternal Fetal Medicine will have results in approximately 5-7 business days and will call patient or notify via [employer].  Patient aware viewing lab result online will reveal fetal sex if  ordered.    Patient verbalized understanding of all instructions and no questions at this time.

## 2025-02-10 NOTE — PROGRESS NOTES
Patient chose to have [facility] [id number] Non-Invasive Prenatal Screen [id number] PLUS w/ SCA, WITH fetal sex.  Patient choose to be billed through insurance.     Patient given brochure and is aware [facility] will contact patient's insurance and coordinate coverage.  Provided [facility] contact information. General inquiries [phone], Cost estimates [phone] and [facility] Billing [phone]. Website [email or web address].     Blood collection tubes labeled with patient identifiers (name, medical record number, and date of birth).     Filled out [facility] order form. Patient chose to have blood drawn in our office at time of visit. NIPS was drawn from left arm with a butterfly needle by Maria Ines. .      If patient chose to have blood work drawn at a [facility] lab we requested patient notify MFM (via phone call or Tune message) when blood collected so office can follow up on results.       Maternal Fetal Medicine will have results in approximately 5-7 business days and will call patient or notify via Tune.  Patient aware viewing lab result online will reveal fetal sex if ordered.    Patient verbalized understanding of all instructions and no questions at this time.

## 2025-02-13 NOTE — PROGRESS NOTES
A fetal ultrasound was completed. See Ob procedures in Epic for an interpretation and recommendations. Do not hesitate to contact us in [facility] if you have questions.    Raghav Hay MD, MSCE  Maternal Fetal Medicine

## 2025-02-14 ENCOUNTER — RESULTS FOLLOW-UP (OUTPATIENT)
Dept: PERINATAL CARE | Facility: CLINIC | Age: 35
End: 2025-02-14

## 2025-02-14 LAB
CFDNA.FET/CFDNA.TOTAL SFR FETUS: NORMAL %
CITATION REF LAB TEST: NORMAL
FET 13+18+21+X+Y ANEUP PLAS.CFDNA: NEGATIVE
FET CHR 21 TS PLAS.CFDNA QL: NEGATIVE
FET CHR 21 TS PLAS.CFDNA QL: NEGATIVE
FET MS X RISK WBC.DNA+CFDNA QL: NOT DETECTED
FET SEX PLAS.CFDNA DOSAGE CFDNA: NORMAL
FET TS 13 RISK PLAS.CFDNA QL: NEGATIVE
FET X + Y ANEUP RISK PLAS.CFDNA SEQ-IMP: NOT DETECTED
GA EST FROM CONCEPTION DATE: NORMAL D
GESTATIONAL AGE > 9:: YES
LAB DIRECTOR NAME PROVIDER: NORMAL
LAB DIRECTOR NAME PROVIDER: NORMAL
LABORATORY COMMENT REPORT: NORMAL
LIMITATIONS OF THE TEST: NORMAL
NEGATIVE PREDICTIVE VALUE: NORMAL
PERFORMANCE CHARACTERISTICS: NORMAL
POSITIVE PREDICTIVE VALUE: NORMAL
REF LAB TEST METHOD: NORMAL
SL AMB NOTE:: NORMAL
TEST PERFORMANCE INFO SPEC: NORMAL

## 2025-02-14 NOTE — RESULT ENCOUNTER NOTE
I have reviewed the patient's lab results which are normal.  Please contact the patient to inform her of the normal results, unless Ms. Carr has already reviewed the results using [facility] [place].      Raghav Hay MD    Maternal-fetal medicine

## 2025-03-03 ENCOUNTER — ROUTINE PRENATAL (OUTPATIENT)
Dept: OBGYN CLINIC | Facility: CLINIC | Age: 35
End: 2025-03-03
Payer: COMMERCIAL

## 2025-03-03 VITALS — DIASTOLIC BLOOD PRESSURE: 66 MMHG | SYSTOLIC BLOOD PRESSURE: 110 MMHG | WEIGHT: 137 LBS | BODY MASS INDEX: 23.52 KG/M2

## 2025-03-03 DIAGNOSIS — Z3A.16 16 WEEKS GESTATION OF PREGNANCY: ICD-10-CM

## 2025-03-03 DIAGNOSIS — Z34.82 PRENATAL CARE, SUBSEQUENT PREGNANCY, SECOND TRIMESTER: Primary | ICD-10-CM

## 2025-03-03 LAB
SL AMB  POCT GLUCOSE, UA: NEGATIVE
SL AMB POCT URINE PROTEIN: NEGATIVE

## 2025-03-03 PROCEDURE — 81002 URINALYSIS NONAUTO W/O SCOPE: CPT | Performed by: STUDENT IN AN ORGANIZED HEALTH CARE EDUCATION/TRAINING PROGRAM

## 2025-03-03 PROCEDURE — PNV: Performed by: STUDENT IN AN ORGANIZED HEALTH CARE EDUCATION/TRAINING PROGRAM

## 2025-03-03 NOTE — PROGRESS NOTES
34 y.o.  at 16w4d, here for routine OB visit. Denies LOF, VB, contractions.      Problem List Items Addressed This Visit          Obstetrics/Gynecology    16 weeks gestation of pregnancy    -precautions reviewed  -AFP ordered today, reviewed  -prepregnancy BMI 21 with goal weight gain 25-35#: TWG = 12#             Other Visit Diagnoses         Prenatal care, subsequent pregnancy, second trimester    -  Primary    Relevant Orders    POCT urine dip    Alpha fetoprotein, maternal

## 2025-03-03 NOTE — PROGRESS NOTES
Pt is here for routine ob visit   No concerns at this time  Urine neg/neg   No VB,Cramping,FM   AFP ordered   Declined flu vaccine

## 2025-03-03 NOTE — ASSESSMENT & PLAN NOTE
-precautions reviewed  -AFP ordered today, reviewed  -prepregnancy BMI 21 with goal weight gain 25-35#: TWG = 12#

## 2025-03-17 ENCOUNTER — APPOINTMENT (OUTPATIENT)
Dept: LAB | Facility: CLINIC | Age: 35
End: 2025-03-17
Payer: COMMERCIAL

## 2025-03-17 DIAGNOSIS — Z34.82 PRENATAL CARE, SUBSEQUENT PREGNANCY, SECOND TRIMESTER: ICD-10-CM

## 2025-03-17 PROCEDURE — 36415 COLL VENOUS BLD VENIPUNCTURE: CPT

## 2025-03-17 PROCEDURE — 82105 ALPHA-FETOPROTEIN SERUM: CPT

## 2025-03-20 ENCOUNTER — RESULTS FOLLOW-UP (OUTPATIENT)
Dept: OBGYN CLINIC | Facility: CLINIC | Age: 35
End: 2025-03-20

## 2025-03-20 LAB
2ND TRIMESTER 4 SCREEN SERPL-IMP: NORMAL
AFP ADJ MOM SERPL: 0.75
AFP INTERP AMN-IMP: NORMAL
AFP INTERP SERPL-IMP: NORMAL
AFP INTERP SERPL-IMP: NORMAL
AFP SERPL-MCNC: 37.1 NG/ML
AGE AT DELIVERY: 33.9 YR
GA METHOD: NORMAL
GA: 18.6 WEEKS
IDDM PATIENT QL: NO
MULTIPLE PREGNANCY: NO
NEURAL TUBE DEFECT RISK FETUS: NORMAL %

## 2025-03-29 DIAGNOSIS — K21.9 GASTROESOPHAGEAL REFLUX DISEASE WITHOUT ESOPHAGITIS: ICD-10-CM

## 2025-03-29 DIAGNOSIS — F41.9 ANXIETY: ICD-10-CM

## 2025-03-31 ENCOUNTER — ROUTINE PRENATAL (OUTPATIENT)
Dept: OBGYN CLINIC | Facility: CLINIC | Age: 35
End: 2025-03-31
Payer: COMMERCIAL

## 2025-03-31 ENCOUNTER — TELEPHONE (OUTPATIENT)
Age: 35
End: 2025-03-31

## 2025-03-31 ENCOUNTER — ROUTINE PRENATAL (OUTPATIENT)
Dept: PERINATAL CARE | Facility: CLINIC | Age: 35
End: 2025-03-31
Payer: COMMERCIAL

## 2025-03-31 VITALS
WEIGHT: 140.4 LBS | SYSTOLIC BLOOD PRESSURE: 106 MMHG | HEART RATE: 81 BPM | BODY MASS INDEX: 23.97 KG/M2 | HEIGHT: 64 IN | DIASTOLIC BLOOD PRESSURE: 64 MMHG

## 2025-03-31 VITALS
DIASTOLIC BLOOD PRESSURE: 68 MMHG | HEART RATE: 78 BPM | SYSTOLIC BLOOD PRESSURE: 116 MMHG | WEIGHT: 140 LBS | OXYGEN SATURATION: 99 % | BODY MASS INDEX: 24.03 KG/M2

## 2025-03-31 DIAGNOSIS — O09.899 HISTORY OF PRETERM DELIVERY, CURRENTLY PREGNANT: ICD-10-CM

## 2025-03-31 DIAGNOSIS — Z3A.20 20 WEEKS GESTATION OF PREGNANCY: ICD-10-CM

## 2025-03-31 DIAGNOSIS — Z34.82 PRENATAL CARE, SUBSEQUENT PREGNANCY, SECOND TRIMESTER: ICD-10-CM

## 2025-03-31 DIAGNOSIS — O09.899 HISTORY OF PRETERM DELIVERY, CURRENTLY PREGNANT: Primary | ICD-10-CM

## 2025-03-31 DIAGNOSIS — F41.9 ANXIETY: ICD-10-CM

## 2025-03-31 DIAGNOSIS — O09.899 SHORT INTERVAL BETWEEN PREGNANCIES AFFECTING PREGNANCY, ANTEPARTUM: ICD-10-CM

## 2025-03-31 DIAGNOSIS — O99.340 ANXIETY DISORDER AFFECTING PREGNANCY, ANTEPARTUM: ICD-10-CM

## 2025-03-31 DIAGNOSIS — Z36.3 ENCOUNTER FOR ANTENATAL SCREENING FOR MALFORMATIONS: ICD-10-CM

## 2025-03-31 DIAGNOSIS — O98.319 GENITAL HERPES AFFECTING PREGNANCY, ANTEPARTUM: ICD-10-CM

## 2025-03-31 DIAGNOSIS — F41.9 ANXIETY DISORDER AFFECTING PREGNANCY, ANTEPARTUM: ICD-10-CM

## 2025-03-31 DIAGNOSIS — Z36.86 ENCOUNTER FOR ANTENATAL SCREENING FOR CERVICAL LENGTH: ICD-10-CM

## 2025-03-31 DIAGNOSIS — O09.899 SHORT INTERVAL BETWEEN PREGNANCIES AFFECTING PREGNANCY, ANTEPARTUM: Primary | ICD-10-CM

## 2025-03-31 DIAGNOSIS — A60.09 GENITAL HERPES AFFECTING PREGNANCY, ANTEPARTUM: ICD-10-CM

## 2025-03-31 LAB
SL AMB  POCT GLUCOSE, UA: NORMAL
SL AMB POCT URINE PROTEIN: NORMAL

## 2025-03-31 PROCEDURE — PNV: Performed by: PHYSICIAN ASSISTANT

## 2025-03-31 PROCEDURE — 81002 URINALYSIS NONAUTO W/O SCOPE: CPT | Performed by: PHYSICIAN ASSISTANT

## 2025-03-31 PROCEDURE — 99213 OFFICE O/P EST LOW 20 MIN: CPT | Performed by: NURSE PRACTITIONER

## 2025-03-31 PROCEDURE — 76805 OB US >/= 14 WKS SNGL FETUS: CPT | Performed by: OBSTETRICS & GYNECOLOGY

## 2025-03-31 PROCEDURE — 76817 TRANSVAGINAL US OBSTETRIC: CPT | Performed by: OBSTETRICS & GYNECOLOGY

## 2025-03-31 RX ORDER — OMEPRAZOLE 20 MG/1
20 CAPSULE, DELAYED RELEASE ORAL DAILY
Qty: 90 CAPSULE | Refills: 1 | Status: SHIPPED | OUTPATIENT
Start: 2025-03-31

## 2025-03-31 NOTE — ASSESSMENT & PLAN NOTE
Jeannie  is a 34 y.o.  @20w4d who presents for routine prenatal visit.    Some fatigue reported. Manageable. Denies other OB complaints.     HIV, Hep B, Hep C, Syphilis screening not completed with PN labs. This was inadvertently not discussed at visit. Triage notified to call pt following visit.     NIPT - low risk.   AFP - neg  Level II -- scheduled for later today   28 week lab orders will be given at next appt.     TWG 6.804 kg (15 lb) . Pregravid BMI 21.     Good fetal movement.  Denies contractions, cramping, leakage of fluid or vaginal bleeding.     Reviewed PTL precautions and reasons to call.

## 2025-03-31 NOTE — TELEPHONE ENCOUNTER
----- Message from Cary Toscano PA-C sent at 3/31/2025  2:05 PM EDT -----  Regarding: labs  It looks like HIV, Hep B, Hep C, and syphilis screening was not drawn with her PN labs.  I inadvertently did not address at appt today -- please have go for this at her convenience   They are not fasting labs

## 2025-03-31 NOTE — TELEPHONE ENCOUNTER
RN placed a call to patient with provider's recommendations. Pt verbalized an understanding. Will obtain remaining prenatal labs as recommended. No further questions.

## 2025-03-31 NOTE — PROGRESS NOTES
Ultrasound Probe Disinfection    A transvaginal ultrasound was performed.   Prior to use, disinfection was performed with High Level Disinfection Process ([employer]).  Probe serial number B2: [id number] was used.    Jennifer Garcia  03/31/25  12:29 PM

## 2025-03-31 NOTE — PROGRESS NOTES
BEH IP Unit Acuity Rating Score (UARS)  Patient is given one point for every criteria they meet.     CRITERIA SCORING   On a 72 hour hold, court hold, committed, stay of commitment, or revocation. 1    Patient LOS on BEH unit exceeds 20 days. 1  LOS: 35    Patient under guardianship, 55+, otherwise medically complex, or under age 11. 0   Suicide ideation without relief of precipitating factors. 0   Current plan for suicide. 0   Current plan for homicide. 0   Imminent risk or actual attempt to seriously harm another without relief of factors precipitating the attempt. 0   Severe dysfunction in daily living (ex: complete neglect for self care, extreme disruption in vegetative function, extreme deterioration in social interactions). 1   Recent (last 7 days) or current physical aggression in the ED or on unit. 0   Restraints or seclusion episode in past 72 hours. 0   Recent (last 7 days) or current verbal aggression, agitation, yelling, etc., while in the ED or unit. Last 8/1/24 1      Active psychosis. 1   Need for constant or near constant redirection (from leaving, from others, etc).  0   Intrusive or disruptive behaviors. 0   Patient requires 3 or more hours of individualized nursing care per 8-hour shift (i.e. for ADLs, meds, therapeutic interventions). 1   TOTAL 6      The patient was seen today for an ultrasound.  Please see ultrasound report (located under Ob Procedures) for additional details.   Thank you very much for allowing us to participate in the care of this very nice patient.  Should you have any questions, please do not hesitate to contact me.     Rohit Christie MD FACOG  Attending Physician, Maternal-Fetal Medicine  [facility]

## 2025-03-31 NOTE — PROGRESS NOTES
Problem List Items Addressed This Visit       20 weeks gestation of pregnancy    Jeannie  is a 34 y.o.  @20w4d who presents for routine prenatal visit.    Some fatigue reported. Manageable. Denies other OB complaints.     HIV, Hep B, Hep C, Syphilis screening not completed with PN labs. This was inadvertently not discussed at visit. Triage notified to call pt following visit.     NIPT - low risk.   AFP - neg  Level II -- scheduled for later today   28 week lab orders will be given at next appt.     TWG 6.804 kg (15 lb) . Pregravid BMI 21.     Good fetal movement.  Denies contractions, cramping, leakage of fluid or vaginal bleeding.     Reviewed PTL precautions and reasons to call.             Relevant Orders    POCT urine dip (Completed)    History of  delivery, currently pregnant - Primary    Anxiety disorder affecting pregnancy, antepartum    Short interval between pregnancies affecting pregnancy, antepartum    NIPT low risk. AFP neg.  Level II later today          Genital herpes affecting pregnancy     Other Visit Diagnoses         Prenatal care, subsequent pregnancy, second trimester        Relevant Orders    POCT urine dip (Completed)    HIV 1/2 AG/AB w Reflex [facility] for 2 yr old and above    Hepatitis C antibody    Hepatitis B surface antigen    RPR-Syphilis Screening (Total Syphilis IGG/IGM)

## 2025-03-31 NOTE — PROGRESS NOTES
Prenatal visit  GA  20w 4d   Denies any vaginal bleeding, Leaking of fluid or pelvic cramping  + Flutters  Prenatal  labs completed 02/07/2025  AFP neg  Level II scheduled 03/31/2025  Declined flu vaccine previously.  -EPDS scale 5

## 2025-03-31 NOTE — TELEPHONE ENCOUNTER
Called the patient to inform about refill request and to try and schedule office visit and the patient stated that she doesn't have a need to come to our office and that this refill request should have went to her family doctor as that is who prescribes it currently. The patient declined an office visit and will go through pcp for future refills.

## 2025-04-28 ENCOUNTER — ROUTINE PRENATAL (OUTPATIENT)
Dept: OBGYN CLINIC | Facility: MEDICAL CENTER | Age: 35
End: 2025-04-28
Payer: COMMERCIAL

## 2025-04-28 VITALS
HEIGHT: 64 IN | SYSTOLIC BLOOD PRESSURE: 126 MMHG | WEIGHT: 143 LBS | DIASTOLIC BLOOD PRESSURE: 68 MMHG | BODY MASS INDEX: 24.41 KG/M2

## 2025-04-28 DIAGNOSIS — Z34.82 PRENATAL CARE, SUBSEQUENT PREGNANCY, SECOND TRIMESTER: Primary | ICD-10-CM

## 2025-04-28 DIAGNOSIS — F41.9 ANXIETY DISORDER AFFECTING PREGNANCY, ANTEPARTUM: ICD-10-CM

## 2025-04-28 DIAGNOSIS — Z3A.24 24 WEEKS GESTATION OF PREGNANCY: ICD-10-CM

## 2025-04-28 DIAGNOSIS — O99.340 ANXIETY DISORDER AFFECTING PREGNANCY, ANTEPARTUM: ICD-10-CM

## 2025-04-28 DIAGNOSIS — O09.899 SHORT INTERVAL BETWEEN PREGNANCIES AFFECTING PREGNANCY, ANTEPARTUM: ICD-10-CM

## 2025-04-28 PROBLEM — R11.2 NAUSEA & VOMITING: Status: RESOLVED | Noted: 2024-02-17 | Resolved: 2025-04-28

## 2025-04-28 LAB
SL AMB  POCT GLUCOSE, UA: NORMAL
SL AMB POCT URINE PROTEIN: NORMAL

## 2025-04-28 PROCEDURE — 81002 URINALYSIS NONAUTO W/O SCOPE: CPT | Performed by: STUDENT IN AN ORGANIZED HEALTH CARE EDUCATION/TRAINING PROGRAM

## 2025-04-28 PROCEDURE — PNV: Performed by: STUDENT IN AN ORGANIZED HEALTH CARE EDUCATION/TRAINING PROGRAM

## 2025-04-28 NOTE — ASSESSMENT & PLAN NOTE
35yo  at 24.4wks presents for routine prenatal visit    Pt denies contractions, vaginal bleeding, leakage of fluid. Endorses fetal movement.    -continue PNVs   -28wk labs ordered  - labor precautions   -follow up in 4 weeks

## 2025-04-28 NOTE — PROGRESS NOTES
24 weeks gestation of pregnancy  35yo  at 24.4wks presents for routine prenatal visit    Pt denies contractions, vaginal bleeding, leakage of fluid. Endorses fetal movement.    -continue PNVs   -28wk labs ordered  - labor precautions   -follow up in 4 weeks     Short interval between pregnancies affecting pregnancy, antepartum  -32 week growth     Anxiety disorder affecting pregnancy, antepartum  -stable on prozac

## 2025-05-12 ENCOUNTER — APPOINTMENT (OUTPATIENT)
Dept: LAB | Facility: CLINIC | Age: 35
End: 2025-05-12
Payer: COMMERCIAL

## 2025-05-12 DIAGNOSIS — Z34.82 PRENATAL CARE, SUBSEQUENT PREGNANCY, SECOND TRIMESTER: ICD-10-CM

## 2025-05-12 LAB
BASOPHILS # BLD AUTO: 0.02 THOUSANDS/ÂΜL (ref 0–0.1)
BASOPHILS NFR BLD AUTO: 0 % (ref 0–1)
EOSINOPHIL # BLD AUTO: 0.11 THOUSAND/ÂΜL (ref 0–0.61)
EOSINOPHIL NFR BLD AUTO: 1 % (ref 0–6)
ERYTHROCYTE [DISTWIDTH] IN BLOOD BY AUTOMATED COUNT: 11.8 % (ref 11.6–15.1)
GLUCOSE 1H P 50 G GLC PO SERPL-MCNC: 70 MG/DL (ref 70–134)
HCT VFR BLD AUTO: 35.7 % (ref 34.8–46.1)
HGB BLD-MCNC: 11.6 G/DL (ref 11.5–15.4)
IMM GRANULOCYTES # BLD AUTO: 0.06 THOUSAND/UL (ref 0–0.2)
IMM GRANULOCYTES NFR BLD AUTO: 1 % (ref 0–2)
LYMPHOCYTES # BLD AUTO: 1.61 THOUSANDS/ÂΜL (ref 0.6–4.47)
LYMPHOCYTES NFR BLD AUTO: 18 % (ref 14–44)
MCH RBC QN AUTO: 31.3 PG (ref 26.8–34.3)
MCHC RBC AUTO-ENTMCNC: 32.5 G/DL (ref 31.4–37.4)
MCV RBC AUTO: 96 FL (ref 82–98)
MONOCYTES # BLD AUTO: 0.42 THOUSAND/ÂΜL (ref 0.17–1.22)
MONOCYTES NFR BLD AUTO: 5 % (ref 4–12)
NEUTROPHILS # BLD AUTO: 6.62 THOUSANDS/ÂΜL (ref 1.85–7.62)
NEUTS SEG NFR BLD AUTO: 75 % (ref 43–75)
NRBC BLD AUTO-RTO: 0 /100 WBCS
PLATELET # BLD AUTO: 180 THOUSANDS/UL (ref 149–390)
PMV BLD AUTO: 10.5 FL (ref 8.9–12.7)
RBC # BLD AUTO: 3.71 MILLION/UL (ref 3.81–5.12)
WBC # BLD AUTO: 8.84 THOUSAND/UL (ref 4.31–10.16)

## 2025-05-12 PROCEDURE — 85025 COMPLETE CBC W/AUTO DIFF WBC: CPT

## 2025-05-12 PROCEDURE — 82950 GLUCOSE TEST: CPT

## 2025-05-12 PROCEDURE — 36415 COLL VENOUS BLD VENIPUNCTURE: CPT

## 2025-05-13 ENCOUNTER — RESULTS FOLLOW-UP (OUTPATIENT)
Dept: LABOR AND DELIVERY | Facility: HOSPITAL | Age: 35
End: 2025-05-13

## 2025-05-29 ENCOUNTER — ROUTINE PRENATAL (OUTPATIENT)
Dept: OBGYN CLINIC | Facility: CLINIC | Age: 35
End: 2025-05-29

## 2025-05-29 VITALS
OXYGEN SATURATION: 98 % | SYSTOLIC BLOOD PRESSURE: 102 MMHG | DIASTOLIC BLOOD PRESSURE: 60 MMHG | WEIGHT: 148.8 LBS | BODY MASS INDEX: 25.54 KG/M2

## 2025-05-29 DIAGNOSIS — Z34.83 PRENATAL CARE, SUBSEQUENT PREGNANCY IN THIRD TRIMESTER: Primary | ICD-10-CM

## 2025-05-29 DIAGNOSIS — F41.9 ANXIETY DISORDER AFFECTING PREGNANCY, ANTEPARTUM: ICD-10-CM

## 2025-05-29 DIAGNOSIS — O98.319 GENITAL HERPES AFFECTING PREGNANCY, ANTEPARTUM: ICD-10-CM

## 2025-05-29 DIAGNOSIS — O99.340 ANXIETY DISORDER AFFECTING PREGNANCY, ANTEPARTUM: ICD-10-CM

## 2025-05-29 DIAGNOSIS — Z11.3 SCREENING FOR STD (SEXUALLY TRANSMITTED DISEASE): ICD-10-CM

## 2025-05-29 DIAGNOSIS — O09.899 SHORT INTERVAL BETWEEN PREGNANCIES AFFECTING PREGNANCY, ANTEPARTUM: ICD-10-CM

## 2025-05-29 DIAGNOSIS — A60.09 GENITAL HERPES AFFECTING PREGNANCY, ANTEPARTUM: ICD-10-CM

## 2025-05-29 DIAGNOSIS — K21.00 GASTROESOPHAGEAL REFLUX DISEASE WITH ESOPHAGITIS WITHOUT HEMORRHAGE: ICD-10-CM

## 2025-05-29 DIAGNOSIS — O09.899 HISTORY OF PRETERM DELIVERY, CURRENTLY PREGNANT: ICD-10-CM

## 2025-05-29 DIAGNOSIS — Z3A.29 29 WEEKS GESTATION OF PREGNANCY: ICD-10-CM

## 2025-05-29 DIAGNOSIS — Z23 NEED FOR TDAP VACCINATION: ICD-10-CM

## 2025-05-29 LAB
SL AMB  POCT GLUCOSE, UA: NEGATIVE
SL AMB POCT URINE PROTEIN: NEGATIVE

## 2025-05-29 NOTE — PROGRESS NOTES
Name: Jeannie Carr      : 1990      MRN: [id number]  Encounter Provider: Cary Hua PA-C  Encounter Date: 2025   Encounter department: [facility] [place]  :  Assessment & Plan  Prenatal care, subsequent pregnancy in third trimester    Orders:    POCT urine dip    29 weeks gestation of pregnancy  Jeannie  is a 34 y.o.  @29w0d who presents for routine prenatal visit.      Has not completed STI blood work. Pt states she has declined at the lab because she is low risk and is trying to minimize cost. Discussed reasoning behind STI bloodwork for all pregnant women. Reviewed importance of this testing from a peds standpoint as well. If STI blood work is refused we reviewed baby will likely require additional testing and monitoring given unknown maternal STI status. After counseling pt is agreeable to STI bloodwork and will plan to complete    28 wk labs - normal 1 hour and CBC  Growth scan- scheduled  TDAP- given today    The patient was counseled about the labor process. She was counseled regarding potential reasons that she may need a  section including arrest of dilation/descent, non-reassuring fetal status, or worsening maternal status.      She was counseled on the risks of  including bleeding, infection, and injury to surrounding structures including the bowel and bladder. She was counseled that there are medical and surgical methods to manage excessive postpartum bleeding. She was counseled that in the event of excessive blood loss, she may require blood transfusion which includes a small risk of blood borne diseases such as hepatitis and HIV. The patient is OK with receiving a blood transfusion if necessary.  The patient had an opportunity to ask questions and signed consent.      She was counseled about the possible need for operative delivery using the vacuum or forceps and the indications for doing so. She was counseled that there is  a small risk of  complications including intracranial hemorrhage, lacerations, nerve damage or fracture as well as the increased risk for more severe maternal laceration. The patient signed consent.     Reports good fetal movement.  Denies LOF, vaginal bleeding, regular uterine contractions, cramping, headaches or visual changes.      Reviewed PTL/Labor precautions and FKC.        Orders:    POCT urine dip    Short interval between pregnancies affecting pregnancy, antepartum  Third trimester growth US scheduled         History of  delivery, currently pregnant         Genital herpes affecting pregnancy, antepartum  Discussed suppressive therapy at 36 wks  Pt states she has declined this in all prior pregnancies as she has only ever had one outbreak in the early   If she has an outbreak in the pregnancy she is will to take suppressive therapy         Anxiety disorder affecting pregnancy, antepartum  Stable on current dose of Prozac         Need for Tdap vaccination    Orders:    Tdap Vaccine greater than or equal to 8yo    Screening for STD (sexually transmitted disease)    Orders:    Hepatitis B surface antigen; Future    Hepatitis C antibody; Future    HIV 1/2 AG/AB w Reflex [facility] for 2 yr old and above; Future    RPR-Syphilis Screening (Total Syphilis IGG/IGM); Future    Gastroesophageal reflux disease with esophagitis without hemorrhage  Managed with prilosec             History of Present Illness   HPI    Patient is a 34 y.o.  at 29w0d gestation presenting for a routine prenatal visit. Patient is doing well today. Denies VB, LOF, Contractions, headaches, visual changes. She reports good FM     Review of Systems   Constitutional: Negative.    Respiratory: Negative.     Cardiovascular: Negative.    Gastrointestinal: Negative.    Genitourinary: Negative.    Musculoskeletal: Negative.    Skin: Negative.    Psychiatric/Behavioral: Negative.            Objective   /60   Wt 67.5 kg (148  lb 12.8 oz)   LMP 11/07/2024 (Exact Date)   SpO2 98%   BMI 25.54 kg/m²      Physical Exam  Vitals reviewed.   Constitutional:       Appearance: Normal appearance.   HENT:      Head: Normocephalic and atraumatic.   Pulmonary:      Effort: Pulmonary effort is normal.     Skin:     General: Skin is warm and dry.     Neurological:      General: No focal deficit present.      Mental Status: She is alert. Mental status is at baseline.     Psychiatric:         Mood and Affect: Mood normal.         Behavior: Behavior normal.         Thought Content: Thought content normal.         Judgment: Judgment normal.       OB exam completed: fundal height, +FHT

## 2025-05-29 NOTE — ASSESSMENT & PLAN NOTE
Discussed suppressive therapy at 36 wks  Pt states she has declined this in all prior pregnancies as she has only ever had one outbreak in the early 2000s  If she has an outbreak in the pregnancy she is will to take suppressive therapy

## 2025-05-29 NOTE — ASSESSMENT & PLAN NOTE
Managed with prilosec          [Endometrial Biopsy] : Endometrial biopsy [Time out performed] : Pre-procedure time out performed.  Patient's name, date of birth and procedure confirmed. [Consent Obtained] : Consent obtained [Pre-op Evaluation] : Pre-op evaluation [Post-Menop. Bleeding] : post-menopausal bleeding [Risks] : risks [Benefits] : benefits [Alternatives] : alternatives [Patient] : patient [Infection] : infection [Bleeding] : bleeding [Allergic Reaction] : allergic reaction [Uterine Perforation] : uterine perforation [Betadine] : Betadine [___ mL Injected] : [unfilled] ~UmL of lidocaine [Tenaculum] : Tenaculum [Easy Passage] : Easy passage [Moderate] : moderate [Tolerated Well] : Patient tolerated the procedure well [No Complications] : No complications

## 2025-05-29 NOTE — ASSESSMENT & PLAN NOTE
Jeannie  is a 34 y.o.  @29w0d who presents for routine prenatal visit.      Has not completed STI blood work. Pt states she has declined at the lab because she is low risk and is trying to minimize cost. Discussed reasoning behind STI bloodwork for all pregnant women. Reviewed importance of this testing from a peds standpoint as well. If STI blood work is refused we reviewed baby will likely require additional testing and monitoring given unknown maternal STI status. After counseling pt is agreeable to STI bloodwork and will plan to complete    28 wk labs - normal 1 hour and CBC  Growth scan- scheduled  TDAP- given today    The patient was counseled about the labor process. She was counseled regarding potential reasons that she may need a  section including arrest of dilation/descent, non-reassuring fetal status, or worsening maternal status.      She was counseled on the risks of  including bleeding, infection, and injury to surrounding structures including the bowel and bladder. She was counseled that there are medical and surgical methods to manage excessive postpartum bleeding. She was counseled that in the event of excessive blood loss, she may require blood transfusion which includes a small risk of blood borne diseases such as hepatitis and HIV. The patient is OK with receiving a blood transfusion if necessary.  The patient had an opportunity to ask questions and signed consent.      She was counseled about the possible need for operative delivery using the vacuum or forceps and the indications for doing so. She was counseled that there is a small risk of  complications including intracranial hemorrhage, lacerations, nerve damage or fracture as well as the increased risk for more severe maternal laceration. The patient signed consent.     Reports good fetal movement.  Denies LOF, vaginal bleeding, regular uterine contractions, cramping, headaches or visual changes.      Reviewed  PTL/Labor precautions and FKC.        Orders:    POCT urine dip

## 2025-06-05 PROBLEM — O98.313 GENITAL HERPES AFFECTING PREGNANCY IN THIRD TRIMESTER: Status: ACTIVE | Noted: 2025-03-31

## 2025-06-05 PROBLEM — Z3A.30 30 WEEKS GESTATION OF PREGNANCY: Status: ACTIVE | Noted: 2025-02-05

## 2025-06-05 NOTE — ASSESSMENT & PLAN NOTE
- Currently on Prozac   - Doing well        
- Doing well. Up to date with testing.  - Aware of growth US  - Pt is overall doing well.  - Explained if contractions occur every 5 minutes, lasting for 1 minute and occurring for 1 hour, then she is in labor and needs to call office.  - Pt also aware to call office ASAP  if vaginal bleeding, leakage of fluids, decreased fetal movement.         
- suppression to start 36 weeks   - last outbreak was 2014       
Femur fracture, right

## 2025-06-05 NOTE — PROGRESS NOTES
Name: Jeannie Carr      : 1990      MRN: [id number]  Encounter Provider: Alpesh Cardona PA-C  Encounter Date: 2025   Encounter department: [facility] [place]  :  Assessment & Plan  30 weeks gestation of pregnancy  - Doing well. Up to date with testing.  - Aware of growth US  - Pt is overall doing well.  - Explained if contractions occur every 5 minutes, lasting for 1 minute and occurring for 1 hour, then she is in labor and needs to call office.  - Pt also aware to call office ASAP  if vaginal bleeding, leakage of fluids, decreased fetal movement.         History of  delivery, currently pregnant         Short interval between pregnancies affecting pregnancy, antepartum         Anxiety disorder affecting pregnancy, antepartum  - Currently on Prozac   - Doing well        Genital herpes affecting pregnancy in third trimester  - suppression to start 36 weeks   - last outbreak was            History of Present Illness   HPI  Jeannie presents today for her prenatal visit.  She is currently 30w0d.     Patient has declined cervical surveillance and vaginal progesterone due to hx of  birth.    Patient has no complaints.    Pt denies decreased fetal movement, vaginal bleeding, LOF, and cramping.    Prenatal Screening:  - Blood Type: AB+  - Pap: 24 NILM/hpv neg   - Labs:    PN 1: completed,  28 Week Labs: normal , she completed STD testing    - Genetics  NIPT: low risk  AFP: neg    - Ultrasound:  Level 1: normal  Level 2: b/l choroid plexus cysts noted, using benign and do not cause health issues, resolve on own, declined cervical surveillance  32 wk Growth and completion of anatomic survery: 2025    - Vaccines:  Tdap: 2025  Flu Shot: Will offer in season  RSV:  Will offer during season @ [id number]     - GBS swab: due 36 weeks     - Contraception: TBD    - Pediatrician: David martinez     Blue folder: given  Yellow folder:  given     Breast pump order: will be ordered at 28 weeks     Delivery consent: obtained 06/29/2025    Review of Systems   Constitutional:  Negative for chills and fatigue.        Additional ROS in HPI          Objective   LMP 11/07/2024 (Exact Date)        Physical Exam  Constitutional:       General: She is not in acute distress.     Appearance: She is not ill-appearing.   HENT:      Head: Normocephalic and atraumatic.      Nose: Nose normal.     Eyes:      General: Lids are normal.      Extraocular Movements: Extraocular movements intact.     Abdominal:      Palpations: Abdomen is soft.      Tenderness: There is no abdominal tenderness.      Comments: Gravid Abdomen      Skin:     General: Skin is cool and dry.     Neurological:      General: No focal deficit present.      Mental Status: She is alert and oriented to person, place, and time.     Psychiatric:         Attention and Perception: Attention normal.         Speech: Speech normal.

## 2025-06-06 ENCOUNTER — APPOINTMENT (OUTPATIENT)
Dept: LAB | Facility: CLINIC | Age: 35
End: 2025-06-06
Payer: COMMERCIAL

## 2025-06-06 ENCOUNTER — RESULTS FOLLOW-UP (OUTPATIENT)
Dept: OBGYN CLINIC | Facility: CLINIC | Age: 35
End: 2025-06-06

## 2025-06-06 DIAGNOSIS — Z11.3 SCREENING FOR STD (SEXUALLY TRANSMITTED DISEASE): ICD-10-CM

## 2025-06-06 LAB
HBV SURFACE AG SER QL: NORMAL
HCV AB SER QL: NORMAL
HIV 1+2 AB+HIV1 P24 AG SERPL QL IA: NORMAL
TREPONEMA PALLIDUM IGG+IGM AB [PRESENCE] IN SERUM OR PLASMA BY IMMUNOASSAY: NORMAL

## 2025-06-06 PROCEDURE — 87389 HIV-1 AG W/HIV-1&-2 AB AG IA: CPT

## 2025-06-06 PROCEDURE — 86803 HEPATITIS C AB TEST: CPT

## 2025-06-06 PROCEDURE — 36415 COLL VENOUS BLD VENIPUNCTURE: CPT

## 2025-06-06 PROCEDURE — 87340 HEPATITIS B SURFACE AG IA: CPT

## 2025-06-06 PROCEDURE — 86780 TREPONEMA PALLIDUM: CPT

## 2025-06-09 ENCOUNTER — ROUTINE PRENATAL (OUTPATIENT)
Dept: OBGYN CLINIC | Facility: CLINIC | Age: 35
End: 2025-06-09

## 2025-06-09 VITALS — SYSTOLIC BLOOD PRESSURE: 122 MMHG | DIASTOLIC BLOOD PRESSURE: 58 MMHG | BODY MASS INDEX: 26.23 KG/M2 | WEIGHT: 152.8 LBS

## 2025-06-09 DIAGNOSIS — O09.899 SHORT INTERVAL BETWEEN PREGNANCIES AFFECTING PREGNANCY, ANTEPARTUM: ICD-10-CM

## 2025-06-09 DIAGNOSIS — O99.340 ANXIETY DISORDER AFFECTING PREGNANCY, ANTEPARTUM: ICD-10-CM

## 2025-06-09 DIAGNOSIS — A60.09 GENITAL HERPES AFFECTING PREGNANCY IN THIRD TRIMESTER: ICD-10-CM

## 2025-06-09 DIAGNOSIS — Z3A.30 30 WEEKS GESTATION OF PREGNANCY: Primary | ICD-10-CM

## 2025-06-09 DIAGNOSIS — F41.9 ANXIETY DISORDER AFFECTING PREGNANCY, ANTEPARTUM: ICD-10-CM

## 2025-06-09 DIAGNOSIS — O98.313 GENITAL HERPES AFFECTING PREGNANCY IN THIRD TRIMESTER: ICD-10-CM

## 2025-06-09 DIAGNOSIS — O09.899 HISTORY OF PRETERM DELIVERY, CURRENTLY PREGNANT: ICD-10-CM

## 2025-06-09 PROCEDURE — PNV: Performed by: PHYSICIAN ASSISTANT

## 2025-06-23 ENCOUNTER — ULTRASOUND (OUTPATIENT)
Facility: HOSPITAL | Age: 35
End: 2025-06-23
Payer: COMMERCIAL

## 2025-06-23 ENCOUNTER — ANCILLARY PROCEDURE (OUTPATIENT)
Facility: HOSPITAL | Age: 35
End: 2025-06-23
Attending: PHYSICIAN ASSISTANT
Payer: COMMERCIAL

## 2025-06-23 VITALS
HEART RATE: 108 BPM | BODY MASS INDEX: 26.05 KG/M2 | WEIGHT: 152.6 LBS | SYSTOLIC BLOOD PRESSURE: 128 MMHG | DIASTOLIC BLOOD PRESSURE: 68 MMHG | HEIGHT: 64 IN

## 2025-06-23 DIAGNOSIS — Z3A.32 32 WEEKS GESTATION OF PREGNANCY: ICD-10-CM

## 2025-06-23 DIAGNOSIS — Z3A.32 32 WEEKS GESTATION OF PREGNANCY: Primary | ICD-10-CM

## 2025-06-23 PROCEDURE — 76816 OB US FOLLOW-UP PER FETUS: CPT | Performed by: OBSTETRICS & GYNECOLOGY

## 2025-06-23 PROCEDURE — NC001 PR NO CHARGE: Performed by: OBSTETRICS & GYNECOLOGY

## 2025-06-23 PROCEDURE — 99213 OFFICE O/P EST LOW 20 MIN: CPT | Performed by: OBSTETRICS & GYNECOLOGY

## 2025-06-27 ENCOUNTER — ROUTINE PRENATAL (OUTPATIENT)
Dept: OBGYN CLINIC | Facility: CLINIC | Age: 35
End: 2025-06-27

## 2025-06-27 VITALS
HEART RATE: 100 BPM | OXYGEN SATURATION: 98 % | WEIGHT: 155 LBS | DIASTOLIC BLOOD PRESSURE: 62 MMHG | SYSTOLIC BLOOD PRESSURE: 104 MMHG | BODY MASS INDEX: 26.61 KG/M2

## 2025-06-27 DIAGNOSIS — O98.313 GENITAL HERPES AFFECTING PREGNANCY IN THIRD TRIMESTER: ICD-10-CM

## 2025-06-27 DIAGNOSIS — O99.340 ANXIETY DISORDER AFFECTING PREGNANCY, ANTEPARTUM: ICD-10-CM

## 2025-06-27 DIAGNOSIS — Z3A.30 30 WEEKS GESTATION OF PREGNANCY: Primary | ICD-10-CM

## 2025-06-27 DIAGNOSIS — O09.899 SHORT INTERVAL BETWEEN PREGNANCIES AFFECTING PREGNANCY, ANTEPARTUM: ICD-10-CM

## 2025-06-27 DIAGNOSIS — F41.9 ANXIETY DISORDER AFFECTING PREGNANCY, ANTEPARTUM: ICD-10-CM

## 2025-06-27 DIAGNOSIS — A60.09 GENITAL HERPES AFFECTING PREGNANCY IN THIRD TRIMESTER: ICD-10-CM

## 2025-06-27 PROCEDURE — PNV: Performed by: OBSTETRICS & GYNECOLOGY

## 2025-06-27 NOTE — ASSESSMENT & PLAN NOTE
Pt doing well today, no complaints  +FM. Denies ctx, vb and lof.   Pt anxious regarding labor- completed birth plan which was briefly reviewed today- had precipitous labor and delivery with last child and is concerned that this will likely happy again. Hopes delivery is more calm rather than chaotic this time around.   She is trying to find resources for natural labor as she anticipates, that she will not have time to get an epidural when she gets into active labor.   Encouraged to see if can attend birth class baby and me.    still needs to sign birth plan-she will return at next visit.   Perineal massage reviewed.  Precautions reviewed.  RTO in 2 weeks.

## 2025-06-27 NOTE — PROGRESS NOTES
Problem List Items Addressed This Visit       30 weeks gestation of pregnancy - Primary    Pt doing well today, no complaints  +FM. Denies ctx, vb and lof.   Pt anxious regarding labor- completed birth plan which was briefly reviewed today- had precipitous labor and delivery with last child and is concerned that this will likely happy again. Hopes delivery is more calm rather than chaotic this time around.   She is trying to find resources for natural labor as she anticipates, that she will not have time to get an epidural when she gets into active labor.   Encouraged to see if can attend birth class baby and me.    still needs to sign birth plan-she will return at next visit.   Perineal massage reviewed.  Precautions reviewed.  RTO in 2 weeks.          Anxiety disorder affecting pregnancy, antepartum    Continue prozac         Short interval between pregnancies affecting pregnancy, antepartum    Growth u/s at 32w 68%ile         Genital herpes affecting pregnancy in third trimester    Suppression to start at 36 weeks            Jeannie Carr is a 34 y.o.  at 33w1d who presents today for routine prenatal visit.     /62 (BP Location: Right arm, Patient Position: Sitting, Cuff Size: Standard)   Pulse 100   Wt 70.3 kg (155 lb)   LMP 2024 (Exact Date)   SpO2 98%   BMI 26.61 kg/m²       FH 33 cm

## 2025-07-07 ENCOUNTER — ROUTINE PRENATAL (OUTPATIENT)
Dept: OBGYN CLINIC | Facility: CLINIC | Age: 35
End: 2025-07-07

## 2025-07-07 VITALS
HEART RATE: 93 BPM | OXYGEN SATURATION: 98 % | DIASTOLIC BLOOD PRESSURE: 88 MMHG | HEIGHT: 64 IN | SYSTOLIC BLOOD PRESSURE: 122 MMHG | WEIGHT: 156.4 LBS | BODY MASS INDEX: 26.7 KG/M2

## 2025-07-07 DIAGNOSIS — O98.313 GENITAL HERPES AFFECTING PREGNANCY IN THIRD TRIMESTER: ICD-10-CM

## 2025-07-07 DIAGNOSIS — O09.899 SHORT INTERVAL BETWEEN PREGNANCIES AFFECTING PREGNANCY, ANTEPARTUM: ICD-10-CM

## 2025-07-07 DIAGNOSIS — O09.899 HISTORY OF PRETERM DELIVERY, CURRENTLY PREGNANT: ICD-10-CM

## 2025-07-07 DIAGNOSIS — O99.340 ANXIETY DISORDER AFFECTING PREGNANCY, ANTEPARTUM: ICD-10-CM

## 2025-07-07 DIAGNOSIS — Z3A.34 34 WEEKS GESTATION OF PREGNANCY: Primary | ICD-10-CM

## 2025-07-07 DIAGNOSIS — F41.9 ANXIETY DISORDER AFFECTING PREGNANCY, ANTEPARTUM: ICD-10-CM

## 2025-07-07 DIAGNOSIS — A60.09 GENITAL HERPES AFFECTING PREGNANCY IN THIRD TRIMESTER: ICD-10-CM

## 2025-07-07 NOTE — ASSESSMENT & PLAN NOTE
- Patient is overall doing well.  - Birth Plan reviewed   - Discussed interested in ParaGard to birth control.  Reviewed risk for infection, bleeding, misplacement, and uterine perforation.  Discussed normal to have heavier bleeding and more intense cramping during period with Paragard IUD.  These symptoms will be the most intense the first 3 - 6 months after insertion.    - Reviewed can start perineal massage to aid in prevention of tearing during pregnancy.  - I explained we would be doing a GBS culture at the next visit  - Explained if contractions occur every 5 minutes, lasting for 1 minute and occurring for 1 - 2 hour, then she may be in labor and needs to call office.  - Pt also aware to call office ASAP if vaginal bleeding, leakage of fluids, decreased fetal movement.

## 2025-07-07 NOTE — PROGRESS NOTES
Name: Jeannie Carr      : 1990      MRN: [id number]  Encounter Provider: Alpesh Cardona PA-C  Encounter Date: 2025   Encounter department: [facility] [place]  :  Assessment & Plan  34 weeks gestation of pregnancy  - Patient is overall doing well.  - Birth Plan reviewed   - Discussed interested in ParaGard to birth control.  Reviewed risk for infection, bleeding, misplacement, and uterine perforation.  Discussed normal to have heavier bleeding and more intense cramping during period with Paragard IUD.  These symptoms will be the most intense the first 3 - 6 months after insertion.    - Reviewed can start perineal massage to aid in prevention of tearing during pregnancy.  - I explained we would be doing a GBS culture at the next visit  - Explained if contractions occur every 5 minutes, lasting for 1 minute and occurring for 1 - 2 hour, then she may be in labor and needs to call office.  - Pt also aware to call office ASAP if vaginal bleeding, leakage of fluids, decreased fetal movement.         Anxiety disorder affecting pregnancy, antepartum  - Currently on Prozac   - She feels symptoms controlled        Genital herpes affecting pregnancy in third trimester  - will start suppression 36 weeks            History of Present Illness   HPI  Jeannie presents today for her prenatal visit.  She is currently 34w4d.     Patient has no complaints.    Pt denies decreased fetal movement, vaginal bleeding, LOF, and cramping.    Prenatal Screening:  - Blood Type: AB+  - Pap: 24 NILM/hpv neg   - Labs:    PN 1: completed,  28 Week Labs: normal , she completed STD testing    - Genetics  NIPT: low risk  AFP: neg    - Ultrasound:  Level 1: normal  Level 2: b/l choroid plexus cysts noted, using benign and do not cause health issues, resolve on own, declined cervical surveillance  32 wk Growth and completion of anatomic survery: WNL     - Vaccines:  Tdap: 2025  Flu  Shot: Will offer in season  RSV:  Will offer during season @ [id number]     - GBS swab: due 36 weeks      - Contraception: interested in ParaGard IUD     - Pediatrician: David martinez     Blue folder: given  Yellow folder: given     Breast pump order: will be ordered at 28 weeks     Delivery consent: obtained 06/29/2025  Birth Plan: reviewed today    ROS  - Denies fever and chills. See HPI    Objective   LMP 11/07/2024 (Exact Date)        Physical Exam  Constitutional:       General: She is not in acute distress.     Appearance: She is not ill-appearing.   HENT:      Head: Normocephalic and atraumatic.      Nose: Nose normal.     Eyes:      General: Lids are normal.      Extraocular Movements: Extraocular movements intact.     Abdominal:      Palpations: Abdomen is soft.      Tenderness: There is no abdominal tenderness.      Comments: Gravid Abdomen      Skin:     General: Skin is cool and dry.     Neurological:      General: No focal deficit present.      Mental Status: She is alert and oriented to person, place, and time.     Psychiatric:         Attention and Perception: Attention normal.         Speech: Speech normal.

## 2025-07-15 ENCOUNTER — TELEPHONE (OUTPATIENT)
Dept: OBGYN CLINIC | Facility: CLINIC | Age: 35
End: 2025-07-15

## 2025-07-21 ENCOUNTER — ROUTINE PRENATAL (OUTPATIENT)
Dept: OBGYN CLINIC | Facility: CLINIC | Age: 35
End: 2025-07-21

## 2025-07-21 VITALS
WEIGHT: 160.2 LBS | HEIGHT: 64 IN | SYSTOLIC BLOOD PRESSURE: 122 MMHG | OXYGEN SATURATION: 99 % | HEART RATE: 88 BPM | DIASTOLIC BLOOD PRESSURE: 64 MMHG | BODY MASS INDEX: 27.35 KG/M2

## 2025-07-21 DIAGNOSIS — A60.09 GENITAL HERPES AFFECTING PREGNANCY IN THIRD TRIMESTER: Primary | ICD-10-CM

## 2025-07-21 DIAGNOSIS — O98.313 GENITAL HERPES AFFECTING PREGNANCY IN THIRD TRIMESTER: Primary | ICD-10-CM

## 2025-07-21 DIAGNOSIS — Z3A.36 36 WEEKS GESTATION OF PREGNANCY: ICD-10-CM

## 2025-07-21 DIAGNOSIS — F41.9 ANXIETY DISORDER AFFECTING PREGNANCY, ANTEPARTUM: ICD-10-CM

## 2025-07-21 DIAGNOSIS — O99.340 ANXIETY DISORDER AFFECTING PREGNANCY, ANTEPARTUM: ICD-10-CM

## 2025-07-21 DIAGNOSIS — Z34.83 PRENATAL CARE, SUBSEQUENT PREGNANCY IN THIRD TRIMESTER: ICD-10-CM

## 2025-07-21 DIAGNOSIS — O09.899 SHORT INTERVAL BETWEEN PREGNANCIES AFFECTING PREGNANCY, ANTEPARTUM: ICD-10-CM

## 2025-07-21 DIAGNOSIS — O09.899 HISTORY OF PRETERM DELIVERY, CURRENTLY PREGNANT: ICD-10-CM

## 2025-07-21 PROCEDURE — 87150 DNA/RNA AMPLIFIED PROBE: CPT | Performed by: PHYSICIAN ASSISTANT

## 2025-07-21 PROCEDURE — PNV: Performed by: PHYSICIAN ASSISTANT

## 2025-07-21 RX ORDER — VALACYCLOVIR HYDROCHLORIDE 500 MG/1
500 TABLET, FILM COATED ORAL 2 TIMES DAILY
Qty: 60 TABLET | Refills: 1 | Status: SHIPPED | OUTPATIENT
Start: 2025-07-21 | End: 2025-09-01

## 2025-07-21 NOTE — PROGRESS NOTES
3rd Trimester Visit  Name: Jeannie Carr      : 1990      MRN: [id number]  Encounter Provider: Cary Toscano PA-C  Encounter Date: 2025   Encounter department: [facility] [place]    34 y.o.  at 36w4d presenting for routine OB visit at 36w4d.:  Assessment & Plan  36 weeks gestation of pregnancy  Jeannie  is a 34 y.o.  @36w4d who presents for routine prenatal visit.    Feeling ready.   Some mild cramping and Bhctx. Noting persistent or regular. Resolves with rest. Requests cervical check - 1./-4. Precautions given.     28 wk labs - completed, normal   TDAP - received 25   Delivery consent and birth plan on file   GBS - collected today     Has yellow packet.  TWG 16 kg (35 lb 3.2 oz)    Reports good fetal movement.  Denies LOF, vaginal bleeding, regular uterine contractions, cramping, headaches or visual changes.      Reviewed PTL/Labor precautions and FKC.             History of  delivery, currently pregnant  PTL precautions given         Anxiety disorder affecting pregnancy, antepartum  Feeling well controlled on prozac 20 mg qd         Short interval between pregnancies affecting pregnancy, antepartum         Genital herpes affecting pregnancy in third trimester  Reviewed to start suppressive therapy. Rx sent to pharmacy    Orders:    valACYclovir (VALTREX) 500 mg tablet; Take 1 tablet (500 mg total) by mouth 2 (two) times a day    Prenatal care, subsequent pregnancy in third trimester    Orders:    Strep B DNA probe, amplification        Reviewed premature labor precautions and fetal kick counts.  Advised to continue medications and return in 1 weeks.    History of Present Illness     Denies uterine contractions.  Denies vaginal bleeding or leaking of fluid.  Reports adequate fetal movement of at least 10 movements in 2 hours once daily.    Medical History Reviewed by provider this encounter:     .  Medications Ordered Prior  "to Encounter[1]   Social History[2]     Current Outpatient Medications   Medication Instructions    FLUoxetine (PROZAC) 20 mg, Oral, Daily    Lactobacillus (PROBIOTIC ACIDOPHILUS PO) Take by mouth    omeprazole (PRILOSEC) 20 mg, Oral, Daily    Prenatal Vit-Fe Fumarate-FA (PRENATAL PO) 1 capsule, Daily    valACYclovir (VALTREX) 500 mg, Oral, 2 times daily     Objective   /64 (BP Location: Left arm, Patient Position: Sitting, Cuff Size: Adult)   Pulse 88   Ht 5' 4\" (1.626 m)   Wt 72.7 kg (160 lb 3.2 oz)   LMP 2024 (Exact Date)   SpO2 99%   BMI 27.50 kg/m²      BP: Blood Pressure: 122/64  Wt: 72.7 kg (160 lb 3.2 oz); Body mass index is 27.5 kg/m².; TWG=16 kg (35 lb 3.2 oz)  Fetal Heart Rate: 135; Fundal Height (cm): 37 cm    Abdomen: ”soft”,”non tender”        Pregnancy Problems (from 01/15/25 to present)       Problem Noted Diagnosed Resolved    Genital herpes affecting pregnancy in third trimester 3/31/2025 by Cary Toscano PA-C  No    Overview Signed 3/31/2025 12:31 PM by Cary Toscano PA-C   Plan for suppressive therapy at 36 weeks          Short interval between pregnancies affecting pregnancy, antepartum 2025 by Chantal Del Cid PA-C  No    36 weeks gestation of pregnancy 2025 by Chantal Del Cid PA-C  No    Overview Addendum 2025  4:32 PM by Cary Toscano PA-C   First OB labs - normal. HIV, Hep B, Hep C, syphilis not completed.   Gc/chlamydia - neg   Pap - 24 NILM/hpv neg   Blue folder - has     NIPT - low risk  AFP - neg  Level II 3/31     28 week labs - completed, normal   COVID vaccine -   TDAP - received 25  Delivery consent and birth plan on file   Yellow folder - has     Breast pump -   Pediatrician -   Perineal massage -   GBS -              History of  delivery, currently pregnant 2025 by Chantal Del Cid PA-C  No    Anxiety disorder affecting pregnancy, antepartum 2025 by Chantal Del Cid PA-C  No               [1] "   Current Outpatient Medications on File Prior to Visit   Medication Sig Dispense Refill    FLUoxetine (PROzac) 20 mg capsule TAKE 1 CAPSULE BY MOUTH EVERY DAY 90 capsule 1    Lactobacillus (PROBIOTIC ACIDOPHILUS PO) Take by mouth      omeprazole (PriLOSEC) 20 mg delayed release capsule TAKE 1 CAPSULE BY MOUTH EVERY DAY 90 capsule 1    Prenatal Vit-Fe Fumarate-FA (PRENATAL PO) Take 1 capsule by mouth in the morning       No current facility-administered medications on file prior to visit.   [2]   Social History  Tobacco Use    Smoking status: Never    Smokeless tobacco: Never   Vaping Use    Vaping status: Never Used   Substance and Sexual Activity    Alcohol use: Not Currently     Alcohol/week: 2.0 standard drinks of alcohol     Comment: Very rare - social. none with pregnancy    Drug use: Yes     Types: Marijuana     Comment: pre-children, marijuana, denies , pt's brother- susbstance abuse issues, PGF- ETOH abuse    Sexual activity: Yes     Partners: Male     Birth control/protection: None     Comment: CHAR Vieira/

## 2025-07-21 NOTE — ASSESSMENT & PLAN NOTE
Reviewed to start suppressive therapy. Rx sent to pharmacy    Orders:    valACYclovir (VALTREX) 500 mg tablet; Take 1 tablet (500 mg total) by mouth 2 (two) times a day

## 2025-07-21 NOTE — ASSESSMENT & PLAN NOTE
Jeannie  is a 34 y.o.  @36w4d who presents for routine prenatal visit.    Feeling ready.   Some mild cramping and Bhctx. Noting persistent or regular. Resolves with rest. Requests cervical check - 1.4. Precautions given.     28 wk labs - completed, normal   TDAP - received 25   Delivery consent and birth plan on file   GBS - collected today     Has yellow packet.  TWG 16 kg (35 lb 3.2 oz)    Reports good fetal movement.  Denies LOF, vaginal bleeding, regular uterine contractions, cramping, headaches or visual changes.      Reviewed PTL/Labor precautions and FKC.

## 2025-07-22 ENCOUNTER — TELEPHONE (OUTPATIENT)
Dept: OBGYN CLINIC | Facility: CLINIC | Age: 35
End: 2025-07-22

## 2025-07-22 NOTE — TELEPHONE ENCOUNTER
Called Pt- email information & phone number for PN classes given to pt    advised to call back with any further questions/concerns.

## 2025-07-23 LAB — GP B STREP DNA SPEC QL NAA+PROBE: NEGATIVE

## 2025-07-29 ENCOUNTER — ROUTINE PRENATAL (OUTPATIENT)
Dept: OBGYN CLINIC | Facility: CLINIC | Age: 35
End: 2025-07-29

## 2025-07-29 VITALS
WEIGHT: 162.2 LBS | BODY MASS INDEX: 27.84 KG/M2 | OXYGEN SATURATION: 99 % | HEART RATE: 85 BPM | DIASTOLIC BLOOD PRESSURE: 84 MMHG | SYSTOLIC BLOOD PRESSURE: 118 MMHG

## 2025-07-29 DIAGNOSIS — Z34.83 PRENATAL CARE, SUBSEQUENT PREGNANCY IN THIRD TRIMESTER: Primary | ICD-10-CM

## 2025-07-29 DIAGNOSIS — O98.313 GENITAL HERPES AFFECTING PREGNANCY IN THIRD TRIMESTER: ICD-10-CM

## 2025-07-29 DIAGNOSIS — O09.899 SHORT INTERVAL BETWEEN PREGNANCIES AFFECTING PREGNANCY, ANTEPARTUM: ICD-10-CM

## 2025-07-29 DIAGNOSIS — F41.9 ANXIETY DISORDER AFFECTING PREGNANCY, ANTEPARTUM: ICD-10-CM

## 2025-07-29 DIAGNOSIS — Z3A.37 37 WEEKS GESTATION OF PREGNANCY: ICD-10-CM

## 2025-07-29 DIAGNOSIS — K21.9 GASTROESOPHAGEAL REFLUX DISEASE, UNSPECIFIED WHETHER ESOPHAGITIS PRESENT: ICD-10-CM

## 2025-07-29 DIAGNOSIS — O99.340 ANXIETY DISORDER AFFECTING PREGNANCY, ANTEPARTUM: ICD-10-CM

## 2025-07-29 DIAGNOSIS — O09.899 HISTORY OF PRETERM DELIVERY, CURRENTLY PREGNANT: ICD-10-CM

## 2025-07-29 DIAGNOSIS — A60.09 GENITAL HERPES AFFECTING PREGNANCY IN THIRD TRIMESTER: ICD-10-CM

## 2025-07-29 PROCEDURE — PNV: Performed by: OBSTETRICS & GYNECOLOGY

## 2025-08-06 ENCOUNTER — ROUTINE PRENATAL (OUTPATIENT)
Dept: OBGYN CLINIC | Facility: CLINIC | Age: 35
End: 2025-08-06

## 2025-08-06 VITALS — DIASTOLIC BLOOD PRESSURE: 80 MMHG | SYSTOLIC BLOOD PRESSURE: 118 MMHG | WEIGHT: 163.8 LBS | BODY MASS INDEX: 28.12 KG/M2

## 2025-08-06 DIAGNOSIS — Z3A.38 38 WEEKS GESTATION OF PREGNANCY: Primary | ICD-10-CM

## 2025-08-06 PROBLEM — O36.8190 DECREASED FETAL MOVEMENT: Status: ACTIVE | Noted: 2025-08-06

## 2025-08-06 PROCEDURE — PNV: Performed by: PHYSICIAN ASSISTANT

## 2025-08-13 ENCOUNTER — ANESTHESIA EVENT (INPATIENT)
Dept: ANESTHESIOLOGY | Facility: HOSPITAL | Age: 35
End: 2025-08-13
Payer: COMMERCIAL

## 2025-08-13 ENCOUNTER — HOSPITAL ENCOUNTER (INPATIENT)
Facility: HOSPITAL | Age: 35
LOS: 2 days | Discharge: HOME | End: 2025-08-15
Attending: OBSTETRICS & GYNECOLOGY | Admitting: OBSTETRICS & GYNECOLOGY
Payer: COMMERCIAL

## 2025-08-13 ENCOUNTER — ANESTHESIA (INPATIENT)
Dept: ANESTHESIOLOGY | Facility: HOSPITAL | Age: 35
End: 2025-08-13
Payer: COMMERCIAL

## 2025-08-13 DIAGNOSIS — Z3A.39 39 WEEKS GESTATION OF PREGNANCY: Primary | ICD-10-CM

## 2025-08-13 DIAGNOSIS — Z78.9 BREASTFEEDING (INFANT): ICD-10-CM

## 2025-08-13 PROBLEM — O42.90 LEAKAGE OF AMNIOTIC FLUID: Status: ACTIVE | Noted: 2025-08-13

## 2025-08-13 LAB
ABO GROUP BLD: NORMAL
BASE EXCESS BLDCOA CALC-SCNC: -7.2 MMOL/L (ref 3–11)
BASE EXCESS BLDCOV CALC-SCNC: -7.7 MMOL/L (ref 1–9)
BLD GP AB SCN SERPL QL: NEGATIVE
ERYTHROCYTE [DISTWIDTH] IN BLOOD BY AUTOMATED COUNT: 12.5 % (ref 11.6–15.1)
HCO3 BLDCOA-SCNC: 20.6 MMOL/L (ref 17.3–27.3)
HCO3 BLDCOV-SCNC: 18.6 MMOL/L (ref 12.2–28.6)
HCT VFR BLD AUTO: 36.6 % (ref 34.8–46.1)
HGB BLD-MCNC: 12.2 G/DL (ref 11.5–15.4)
HOLD SPECIMEN: NORMAL
MCH RBC QN AUTO: 30.3 PG (ref 26.8–34.3)
MCHC RBC AUTO-ENTMCNC: 33.3 G/DL (ref 31.4–37.4)
MCV RBC AUTO: 91 FL (ref 82–98)
O2 CT VFR BLDCOA CALC: 7.9 ML/DL
OXYHGB MFR BLDCOA: 36.2 %
OXYHGB MFR BLDCOV: 78.4 %
PCO2 BLDCOA: 50.2 MM[HG] (ref 30–60)
PCO2 BLDCOV: 40.7 MM HG (ref 27–43)
PH BLDCOA: 7.23 [PH] (ref 7.23–7.43)
PH BLDCOV: 7.28 [PH] (ref 7.19–7.49)
PLATELET # BLD AUTO: 180 THOUSANDS/UL (ref 149–390)
PMV BLD AUTO: 11.4 FL (ref 8.9–12.7)
PO2 BLDCOA: 19.4 MM HG (ref 5–25)
PO2 BLDCOV: 39.4 MM HG (ref 15–45)
RBC # BLD AUTO: 4.02 MILLION/UL (ref 3.81–5.12)
RH BLD: POSITIVE
SAO2 % BLDCOV: 17.8 ML/DL
SPECIMEN EXPIRATION DATE: NORMAL
TREPONEMA PALLIDUM IGG+IGM AB [PRESENCE] IN SERUM OR PLASMA BY IMMUNOASSAY: NORMAL
WBC # BLD AUTO: 11.64 THOUSAND/UL (ref 4.31–10.16)

## 2025-08-13 PROCEDURE — 99214 OFFICE O/P EST MOD 30 MIN: CPT

## 2025-08-13 PROCEDURE — 86850 RBC ANTIBODY SCREEN: CPT

## 2025-08-13 PROCEDURE — 86901 BLOOD TYPING SEROLOGIC RH(D): CPT

## 2025-08-13 PROCEDURE — 82805 BLOOD GASES W/O2 SATURATION: CPT | Performed by: OBSTETRICS & GYNECOLOGY

## 2025-08-13 PROCEDURE — 86780 TREPONEMA PALLIDUM: CPT

## 2025-08-13 PROCEDURE — 86900 BLOOD TYPING SEROLOGIC ABO: CPT

## 2025-08-13 PROCEDURE — 85027 COMPLETE CBC AUTOMATED: CPT

## 2025-08-13 RX ORDER — DOCUSATE SODIUM 100 MG/1
100 CAPSULE, LIQUID FILLED ORAL 2 TIMES DAILY
Status: DISCONTINUED | OUTPATIENT
Start: 2025-08-13 | End: 2025-08-15 | Stop reason: HOSPADM

## 2025-08-13 RX ORDER — ONDANSETRON 2 MG/ML
4 INJECTION INTRAMUSCULAR; INTRAVENOUS EVERY 8 HOURS PRN
Status: DISCONTINUED | OUTPATIENT
Start: 2025-08-13 | End: 2025-08-15 | Stop reason: HOSPADM

## 2025-08-13 RX ORDER — PANTOPRAZOLE SODIUM 40 MG/1
40 TABLET, DELAYED RELEASE ORAL
Status: DISCONTINUED | OUTPATIENT
Start: 2025-08-13 | End: 2025-08-15 | Stop reason: HOSPADM

## 2025-08-13 RX ORDER — ONDANSETRON 2 MG/ML
4 INJECTION INTRAMUSCULAR; INTRAVENOUS EVERY 6 HOURS PRN
Status: DISCONTINUED | OUTPATIENT
Start: 2025-08-13 | End: 2025-08-13

## 2025-08-13 RX ORDER — POLYETHYLENE GLYCOL 3350 17 G/17G
17 POWDER, FOR SOLUTION ORAL DAILY PRN
Status: DISCONTINUED | OUTPATIENT
Start: 2025-08-13 | End: 2025-08-15 | Stop reason: HOSPADM

## 2025-08-13 RX ORDER — SODIUM CHLORIDE, SODIUM LACTATE, POTASSIUM CHLORIDE, CALCIUM CHLORIDE 600; 310; 30; 20 MG/100ML; MG/100ML; MG/100ML; MG/100ML
100 INJECTION, SOLUTION INTRAVENOUS CONTINUOUS
Status: DISCONTINUED | OUTPATIENT
Start: 2025-08-13 | End: 2025-08-13

## 2025-08-13 RX ORDER — OXYTOCIN/0.9 % SODIUM CHLORIDE 30/500 ML
PLASTIC BAG, INJECTION (ML) INTRAVENOUS
Status: COMPLETED
Start: 2025-08-13 | End: 2025-08-13

## 2025-08-13 RX ORDER — BENZOCAINE/MENTHOL 6 MG-10 MG
1 LOZENGE MUCOUS MEMBRANE DAILY PRN
Status: DISCONTINUED | OUTPATIENT
Start: 2025-08-13 | End: 2025-08-15 | Stop reason: HOSPADM

## 2025-08-13 RX ORDER — BUPIVACAINE HYDROCHLORIDE 2.5 MG/ML
30 INJECTION, SOLUTION EPIDURAL; INFILTRATION; INTRACAUDAL; PERINEURAL ONCE AS NEEDED
Status: DISCONTINUED | OUTPATIENT
Start: 2025-08-13 | End: 2025-08-13

## 2025-08-13 RX ORDER — TERBUTALINE SULFATE 1 MG/ML
INJECTION SUBCUTANEOUS
Status: COMPLETED
Start: 2025-08-13 | End: 2025-08-13

## 2025-08-13 RX ORDER — IBUPROFEN 600 MG/1
600 TABLET, FILM COATED ORAL EVERY 6 HOURS
Status: DISCONTINUED | OUTPATIENT
Start: 2025-08-13 | End: 2025-08-15 | Stop reason: HOSPADM

## 2025-08-13 RX ORDER — SODIUM CHLORIDE, SODIUM LACTATE, POTASSIUM CHLORIDE, CALCIUM CHLORIDE 600; 310; 30; 20 MG/100ML; MG/100ML; MG/100ML; MG/100ML
125 INJECTION, SOLUTION INTRAVENOUS CONTINUOUS
Status: DISCONTINUED | OUTPATIENT
Start: 2025-08-13 | End: 2025-08-13

## 2025-08-13 RX ORDER — CALCIUM CARBONATE 500 MG/1
1000 TABLET, CHEWABLE ORAL DAILY PRN
Status: DISCONTINUED | OUTPATIENT
Start: 2025-08-13 | End: 2025-08-15 | Stop reason: HOSPADM

## 2025-08-13 RX ORDER — ACETAMINOPHEN 325 MG/1
650 TABLET ORAL EVERY 4 HOURS PRN
Status: DISCONTINUED | OUTPATIENT
Start: 2025-08-13 | End: 2025-08-15 | Stop reason: HOSPADM

## 2025-08-13 RX ORDER — OXYTOCIN/0.9 % SODIUM CHLORIDE 30/500 ML
250 PLASTIC BAG, INJECTION (ML) INTRAVENOUS ONCE
Status: COMPLETED | OUTPATIENT
Start: 2025-08-13 | End: 2025-08-13

## 2025-08-13 RX ORDER — LIDOCAINE HYDROCHLORIDE 10 MG/ML
INJECTION, SOLUTION EPIDURAL; INFILTRATION; INTRACAUDAL; PERINEURAL
Status: DISPENSED
Start: 2025-08-13 | End: 2025-08-13

## 2025-08-13 RX ORDER — VALACYCLOVIR HYDROCHLORIDE 500 MG/1
500 TABLET, FILM COATED ORAL 2 TIMES DAILY
Status: DISCONTINUED | OUTPATIENT
Start: 2025-08-13 | End: 2025-08-13

## 2025-08-13 RX ORDER — DIPHENHYDRAMINE HYDROCHLORIDE 50 MG/ML
25 INJECTION, SOLUTION INTRAMUSCULAR; INTRAVENOUS EVERY 6 HOURS PRN
Status: DISCONTINUED | OUTPATIENT
Start: 2025-08-13 | End: 2025-08-15 | Stop reason: HOSPADM

## 2025-08-13 RX ADMIN — PANTOPRAZOLE SODIUM 40 MG: 40 TABLET, DELAYED RELEASE ORAL at 07:31

## 2025-08-13 RX ADMIN — Medication 250 MILLI-UNITS/MIN: at 04:07

## 2025-08-13 RX ADMIN — BENZOCAINE AND LEVOMENTHOL 1 APPLICATION: 200; 5 SPRAY TOPICAL at 05:58

## 2025-08-13 RX ADMIN — IBUPROFEN 600 MG: 600 TABLET ORAL at 17:54

## 2025-08-13 RX ADMIN — SODIUM CHLORIDE, SODIUM LACTATE, POTASSIUM CHLORIDE, AND CALCIUM CHLORIDE 925 ML/HR: .6; .31; .03; .02 INJECTION, SOLUTION INTRAVENOUS at 02:55

## 2025-08-13 RX ADMIN — IBUPROFEN 600 MG: 600 TABLET ORAL at 11:46

## 2025-08-13 RX ADMIN — SODIUM CHLORIDE, SODIUM LACTATE, POTASSIUM CHLORIDE, AND CALCIUM CHLORIDE 925 ML/HR: .6; .31; .03; .02 INJECTION, SOLUTION INTRAVENOUS at 03:28

## 2025-08-13 RX ADMIN — TERBUTALINE SULFATE 1 MG: 1 INJECTION, SOLUTION SUBCUTANEOUS at 03:38

## 2025-08-13 RX ADMIN — DOCUSATE SODIUM 100 MG: 100 CAPSULE, LIQUID FILLED ORAL at 08:52

## 2025-08-13 RX ADMIN — WITCH HAZEL 1 PAD: 500 SOLUTION RECTAL; TOPICAL at 05:58

## 2025-08-13 RX ADMIN — FLUOXETINE HYDROCHLORIDE 20 MG: 20 CAPSULE ORAL at 08:52

## 2025-08-13 RX ADMIN — DOCUSATE SODIUM 100 MG: 100 CAPSULE, LIQUID FILLED ORAL at 17:54

## 2025-08-13 RX ADMIN — ACETAMINOPHEN 650 MG: 325 TABLET ORAL at 13:08

## 2025-08-13 RX ADMIN — IBUPROFEN 600 MG: 600 TABLET ORAL at 04:51

## 2025-08-13 RX ADMIN — SODIUM CHLORIDE, SODIUM LACTATE, POTASSIUM CHLORIDE, AND CALCIUM CHLORIDE 300 ML: .6; .31; .03; .02 INJECTION, SOLUTION INTRAVENOUS at 03:24

## 2025-08-14 PROBLEM — O42.90 LEAKAGE OF AMNIOTIC FLUID: Status: RESOLVED | Noted: 2025-08-13 | Resolved: 2025-08-14

## 2025-08-14 RX ORDER — BENZOCAINE/MENTHOL 6 MG-10 MG
1 LOZENGE MUCOUS MEMBRANE DAILY PRN
Status: CANCELLED
Start: 2025-08-14

## 2025-08-14 RX ORDER — CALCIUM CARBONATE 500 MG/1
1000 TABLET, CHEWABLE ORAL DAILY PRN
Status: CANCELLED
Start: 2025-08-14

## 2025-08-14 RX ORDER — BENZOCAINE/MENTHOL 6 MG-10 MG
1 LOZENGE MUCOUS MEMBRANE DAILY PRN
Start: 2025-08-14

## 2025-08-14 RX ORDER — DOCUSATE SODIUM 100 MG/1
100 CAPSULE, LIQUID FILLED ORAL 2 TIMES DAILY
Status: CANCELLED
Start: 2025-08-14

## 2025-08-14 RX ORDER — IBUPROFEN 600 MG/1
600 TABLET, FILM COATED ORAL EVERY 6 HOURS
Qty: 30 TABLET | Refills: 0 | OUTPATIENT
Start: 2025-08-14

## 2025-08-14 RX ORDER — ACETAMINOPHEN 325 MG/1
650 TABLET ORAL EVERY 4 HOURS PRN
Qty: 240 TABLET | Refills: 0 | OUTPATIENT
Start: 2025-08-14

## 2025-08-14 RX ADMIN — IBUPROFEN 600 MG: 600 TABLET ORAL at 22:17

## 2025-08-14 RX ADMIN — ACETAMINOPHEN 650 MG: 325 TABLET ORAL at 18:23

## 2025-08-14 RX ADMIN — IBUPROFEN 600 MG: 600 TABLET ORAL at 00:05

## 2025-08-14 RX ADMIN — DOCUSATE SODIUM 100 MG: 100 CAPSULE, LIQUID FILLED ORAL at 18:20

## 2025-08-14 RX ADMIN — PANTOPRAZOLE SODIUM 40 MG: 40 TABLET, DELAYED RELEASE ORAL at 06:14

## 2025-08-14 RX ADMIN — DOCUSATE SODIUM 100 MG: 100 CAPSULE, LIQUID FILLED ORAL at 10:12

## 2025-08-14 RX ADMIN — IBUPROFEN 600 MG: 600 TABLET ORAL at 06:14

## 2025-08-14 RX ADMIN — FLUOXETINE HYDROCHLORIDE 20 MG: 20 CAPSULE ORAL at 10:12

## 2025-08-15 ENCOUNTER — PATIENT MESSAGE (OUTPATIENT)
Dept: OBGYN CLINIC | Facility: CLINIC | Age: 35
End: 2025-08-15

## 2025-08-15 VITALS
DIASTOLIC BLOOD PRESSURE: 81 MMHG | HEIGHT: 64 IN | TEMPERATURE: 98.4 F | OXYGEN SATURATION: 98 % | WEIGHT: 163.8 LBS | BODY MASS INDEX: 27.96 KG/M2 | SYSTOLIC BLOOD PRESSURE: 128 MMHG | RESPIRATION RATE: 18 BRPM | HEART RATE: 86 BPM

## 2025-08-15 RX ORDER — IBUPROFEN 600 MG/1
600 TABLET, FILM COATED ORAL EVERY 6 HOURS
Qty: 30 TABLET | Refills: 0 | Status: SHIPPED | OUTPATIENT
Start: 2025-08-15

## 2025-08-15 RX ORDER — CALCIUM CARBONATE 500 MG/1
1000 TABLET, CHEWABLE ORAL DAILY PRN
Start: 2025-08-15

## 2025-08-15 RX ORDER — BENZOCAINE/MENTHOL 6 MG-10 MG
1 LOZENGE MUCOUS MEMBRANE DAILY PRN
Start: 2025-08-15

## 2025-08-15 RX ORDER — ACETAMINOPHEN 325 MG/1
650 TABLET ORAL EVERY 4 HOURS PRN
Qty: 240 TABLET | Refills: 0 | Status: SHIPPED | OUTPATIENT
Start: 2025-08-15

## 2025-08-15 RX ORDER — DOCUSATE SODIUM 100 MG/1
100 CAPSULE, LIQUID FILLED ORAL 2 TIMES DAILY
Start: 2025-08-15

## 2025-08-15 RX ADMIN — IBUPROFEN 600 MG: 600 TABLET ORAL at 04:04

## 2025-08-15 RX ADMIN — DOCUSATE SODIUM 100 MG: 100 CAPSULE, LIQUID FILLED ORAL at 08:54

## 2025-08-15 RX ADMIN — IBUPROFEN 600 MG: 600 TABLET ORAL at 08:55

## 2025-08-15 RX ADMIN — FLUOXETINE HYDROCHLORIDE 20 MG: 20 CAPSULE ORAL at 08:54

## 2025-08-15 RX ADMIN — PANTOPRAZOLE SODIUM 40 MG: 40 TABLET, DELAYED RELEASE ORAL at 06:33

## 2025-08-19 LAB — PLACENTA IN STORAGE: NORMAL
